# Patient Record
Sex: FEMALE | Race: WHITE
[De-identification: names, ages, dates, MRNs, and addresses within clinical notes are randomized per-mention and may not be internally consistent; named-entity substitution may affect disease eponyms.]

---

## 2019-02-23 ENCOUNTER — HOSPITAL ENCOUNTER (EMERGENCY)
Dept: HOSPITAL 26 - MED | Age: 54
LOS: 1 days | Discharge: HOME | End: 2019-02-24
Payer: MEDICAID

## 2019-02-23 VITALS — SYSTOLIC BLOOD PRESSURE: 132 MMHG | DIASTOLIC BLOOD PRESSURE: 94 MMHG

## 2019-02-23 VITALS — WEIGHT: 145 LBS | BODY MASS INDEX: 27.38 KG/M2 | HEIGHT: 61 IN

## 2019-02-23 DIAGNOSIS — I50.9: ICD-10-CM

## 2019-02-23 DIAGNOSIS — Z98.890: ICD-10-CM

## 2019-02-23 DIAGNOSIS — Z79.899: ICD-10-CM

## 2019-02-23 DIAGNOSIS — F15.10: ICD-10-CM

## 2019-02-23 DIAGNOSIS — E11.9: ICD-10-CM

## 2019-02-23 DIAGNOSIS — I11.0: ICD-10-CM

## 2019-02-23 DIAGNOSIS — Z79.2: ICD-10-CM

## 2019-02-23 DIAGNOSIS — Z79.82: ICD-10-CM

## 2019-02-23 DIAGNOSIS — R10.10: ICD-10-CM

## 2019-02-23 DIAGNOSIS — Z79.84: ICD-10-CM

## 2019-02-23 DIAGNOSIS — J45.909: Primary | ICD-10-CM

## 2019-02-23 LAB
ALBUMIN FLD-MCNC: 4.1 G/DL (ref 3.4–5)
ANION GAP SERPL CALCULATED.3IONS-SCNC: 10.2 MMOL/L (ref 8–16)
AST SERPL-CCNC: 23 U/L (ref 15–37)
BASOPHILS # BLD AUTO: 0.1 K/UL (ref 0–0.22)
BASOPHILS NFR BLD AUTO: 0.7 % (ref 0–2)
BILIRUB SERPL-MCNC: 1.6 MG/DL (ref 0–1)
BUN SERPL-MCNC: 22 MG/DL (ref 7–18)
CHLORIDE SERPL-SCNC: 99 MMOL/L (ref 98–107)
CO2 SERPL-SCNC: 32.4 MMOL/L (ref 21–32)
CREAT SERPL-MCNC: 1.1 MG/DL (ref 0.6–1.3)
EOSINOPHIL # BLD AUTO: 0.3 K/UL (ref 0–0.4)
EOSINOPHIL NFR BLD AUTO: 3.4 % (ref 0–4)
ERYTHROCYTE [DISTWIDTH] IN BLOOD BY AUTOMATED COUNT: 14.5 % (ref 11.6–13.7)
GFR SERPL CREATININE-BSD FRML MDRD: 67 ML/MIN (ref 90–?)
GLUCOSE SERPL-MCNC: 109 MG/DL (ref 74–106)
HCT VFR BLD AUTO: 37.8 % (ref 36–48)
HGB BLD-MCNC: 12.1 G/DL (ref 12–16)
LYMPHOCYTES # BLD AUTO: 0.6 K/UL (ref 2.5–16.5)
LYMPHOCYTES NFR BLD AUTO: 8.1 % (ref 20.5–51.1)
MCH RBC QN AUTO: 28 PG (ref 27–31)
MCHC RBC AUTO-ENTMCNC: 32 G/DL (ref 33–37)
MCV RBC AUTO: 87.8 FL (ref 80–94)
MONOCYTES # BLD AUTO: 0.5 K/UL (ref 0.8–1)
MONOCYTES NFR BLD AUTO: 6.7 % (ref 1.7–9.3)
NEUTROPHILS # BLD AUTO: 6.4 K/UL (ref 1.8–7.7)
NEUTROPHILS NFR BLD AUTO: 81.1 % (ref 42.2–75.2)
PLATELET # BLD AUTO: 307 K/UL (ref 140–450)
POTASSIUM SERPL-SCNC: 3.6 MMOL/L (ref 3.5–5.1)
RBC # BLD AUTO: 4.3 MIL/UL (ref 4.2–5.4)
SODIUM SERPL-SCNC: 138 MMOL/L (ref 136–145)
WBC # BLD AUTO: 7.9 K/UL (ref 4.8–10.8)

## 2019-02-23 PROCEDURE — 96374 THER/PROPH/DIAG INJ IV PUSH: CPT

## 2019-02-23 PROCEDURE — 87804 INFLUENZA ASSAY W/OPTIC: CPT

## 2019-02-23 PROCEDURE — 83880 ASSAY OF NATRIURETIC PEPTIDE: CPT

## 2019-02-23 PROCEDURE — 85025 COMPLETE CBC W/AUTO DIFF WBC: CPT

## 2019-02-23 PROCEDURE — 93005 ELECTROCARDIOGRAM TRACING: CPT

## 2019-02-23 PROCEDURE — 84484 ASSAY OF TROPONIN QUANT: CPT

## 2019-02-23 PROCEDURE — 80053 COMPREHEN METABOLIC PANEL: CPT

## 2019-02-23 PROCEDURE — 83605 ASSAY OF LACTIC ACID: CPT

## 2019-02-23 PROCEDURE — 71045 X-RAY EXAM CHEST 1 VIEW: CPT

## 2019-02-23 PROCEDURE — 94640 AIRWAY INHALATION TREATMENT: CPT

## 2019-02-23 PROCEDURE — 36415 COLL VENOUS BLD VENIPUNCTURE: CPT

## 2019-02-23 PROCEDURE — 99284 EMERGENCY DEPT VISIT MOD MDM: CPT

## 2019-02-23 NOTE — NUR
PATIENT PRESENTS ER WITH C/O SOB DUE TO ASTHMA ATTACK. PT HAS HX OF ASTHMA AND 
CHF. PT STATED THAT SHE TRIED TAKING HER ASTHMA MEDICATION AT HOME,BUT HAD NO 
RELIEF.PT HAS SOME WHEEZES BILATERAL AND A DRY COUGH. PATIENT STATES PAIN OF 
7/10 AT THIS TIME; VSS; PATIENT POSITIONED FOR COMFORT; HOB ELEVATED; BEDRAILS 
UP X2; BED DOWN. ER MD MADE AWARE OF PT STATUS.

## 2019-02-23 NOTE — NUR
PT IS SITTING UP IN BED, PT STATES SHE IS ABLE IN LESS PAIN AND IS ABLE TO 
BREATH WITHOUT DIFFICULTY. ER MD MADE AWARE.

## 2019-02-24 VITALS — DIASTOLIC BLOOD PRESSURE: 115 MMHG | SYSTOLIC BLOOD PRESSURE: 176 MMHG

## 2019-02-24 NOTE — NUR
Patient discharged with v/s stable. Written and verbal after care instructions 
given and explained. 

Patient alert, oriented and verbalized understanding of instructions. 
Ambulatory with steady gait. All questions addressed prior to discharge. ID 
band removed. Patient advised to follow up with PMD. Rx of PREDNISONE AND 
ALBUTEROL WERE given. Patient educated on indication of medication including 
possible reaction and side effects. Opportunity to ask questions provided and 
answered.

## 2019-02-25 ENCOUNTER — HOSPITAL ENCOUNTER (INPATIENT)
Dept: HOSPITAL 26 - MED | Age: 54
LOS: 7 days | Discharge: LEFT BEFORE DISCHARGE | DRG: 190 | End: 2019-03-04
Attending: GENERAL PRACTICE | Admitting: GENERAL PRACTICE
Payer: MEDICAID

## 2019-02-25 VITALS — SYSTOLIC BLOOD PRESSURE: 121 MMHG | DIASTOLIC BLOOD PRESSURE: 81 MMHG

## 2019-02-25 VITALS — DIASTOLIC BLOOD PRESSURE: 92 MMHG | SYSTOLIC BLOOD PRESSURE: 140 MMHG

## 2019-02-25 VITALS — SYSTOLIC BLOOD PRESSURE: 139 MMHG | DIASTOLIC BLOOD PRESSURE: 98 MMHG

## 2019-02-25 VITALS — SYSTOLIC BLOOD PRESSURE: 129 MMHG | DIASTOLIC BLOOD PRESSURE: 92 MMHG

## 2019-02-25 VITALS — DIASTOLIC BLOOD PRESSURE: 84 MMHG | SYSTOLIC BLOOD PRESSURE: 148 MMHG

## 2019-02-25 VITALS — SYSTOLIC BLOOD PRESSURE: 120 MMHG | DIASTOLIC BLOOD PRESSURE: 71 MMHG

## 2019-02-25 VITALS — SYSTOLIC BLOOD PRESSURE: 132 MMHG | DIASTOLIC BLOOD PRESSURE: 94 MMHG

## 2019-02-25 VITALS — SYSTOLIC BLOOD PRESSURE: 127 MMHG | DIASTOLIC BLOOD PRESSURE: 86 MMHG

## 2019-02-25 VITALS — SYSTOLIC BLOOD PRESSURE: 148 MMHG | DIASTOLIC BLOOD PRESSURE: 101 MMHG

## 2019-02-25 VITALS — SYSTOLIC BLOOD PRESSURE: 98 MMHG | DIASTOLIC BLOOD PRESSURE: 65 MMHG

## 2019-02-25 VITALS — DIASTOLIC BLOOD PRESSURE: 92 MMHG | SYSTOLIC BLOOD PRESSURE: 134 MMHG

## 2019-02-25 VITALS — SYSTOLIC BLOOD PRESSURE: 119 MMHG | DIASTOLIC BLOOD PRESSURE: 79 MMHG

## 2019-02-25 VITALS — SYSTOLIC BLOOD PRESSURE: 122 MMHG | DIASTOLIC BLOOD PRESSURE: 81 MMHG

## 2019-02-25 VITALS — SYSTOLIC BLOOD PRESSURE: 134 MMHG | DIASTOLIC BLOOD PRESSURE: 88 MMHG

## 2019-02-25 VITALS — DIASTOLIC BLOOD PRESSURE: 76 MMHG | SYSTOLIC BLOOD PRESSURE: 114 MMHG

## 2019-02-25 VITALS — DIASTOLIC BLOOD PRESSURE: 121 MMHG | SYSTOLIC BLOOD PRESSURE: 166 MMHG

## 2019-02-25 VITALS — DIASTOLIC BLOOD PRESSURE: 101 MMHG | SYSTOLIC BLOOD PRESSURE: 129 MMHG

## 2019-02-25 VITALS — DIASTOLIC BLOOD PRESSURE: 103 MMHG | SYSTOLIC BLOOD PRESSURE: 150 MMHG

## 2019-02-25 VITALS — DIASTOLIC BLOOD PRESSURE: 82 MMHG | SYSTOLIC BLOOD PRESSURE: 129 MMHG

## 2019-02-25 VITALS — SYSTOLIC BLOOD PRESSURE: 105 MMHG | DIASTOLIC BLOOD PRESSURE: 68 MMHG

## 2019-02-25 VITALS — SYSTOLIC BLOOD PRESSURE: 136 MMHG | DIASTOLIC BLOOD PRESSURE: 78 MMHG

## 2019-02-25 VITALS — SYSTOLIC BLOOD PRESSURE: 103 MMHG | DIASTOLIC BLOOD PRESSURE: 78 MMHG

## 2019-02-25 VITALS — SYSTOLIC BLOOD PRESSURE: 140 MMHG | DIASTOLIC BLOOD PRESSURE: 92 MMHG

## 2019-02-25 VITALS — DIASTOLIC BLOOD PRESSURE: 77 MMHG | SYSTOLIC BLOOD PRESSURE: 112 MMHG

## 2019-02-25 VITALS — DIASTOLIC BLOOD PRESSURE: 106 MMHG | SYSTOLIC BLOOD PRESSURE: 151 MMHG

## 2019-02-25 VITALS — SYSTOLIC BLOOD PRESSURE: 138 MMHG | DIASTOLIC BLOOD PRESSURE: 76 MMHG

## 2019-02-25 VITALS — DIASTOLIC BLOOD PRESSURE: 98 MMHG | SYSTOLIC BLOOD PRESSURE: 124 MMHG

## 2019-02-25 VITALS — WEIGHT: 123 LBS | HEIGHT: 65 IN | BODY MASS INDEX: 20.49 KG/M2

## 2019-02-25 VITALS — DIASTOLIC BLOOD PRESSURE: 92 MMHG | SYSTOLIC BLOOD PRESSURE: 129 MMHG

## 2019-02-25 VITALS — SYSTOLIC BLOOD PRESSURE: 109 MMHG | DIASTOLIC BLOOD PRESSURE: 72 MMHG

## 2019-02-25 VITALS — SYSTOLIC BLOOD PRESSURE: 135 MMHG | DIASTOLIC BLOOD PRESSURE: 86 MMHG

## 2019-02-25 VITALS — SYSTOLIC BLOOD PRESSURE: 120 MMHG | DIASTOLIC BLOOD PRESSURE: 83 MMHG

## 2019-02-25 VITALS — SYSTOLIC BLOOD PRESSURE: 142 MMHG | DIASTOLIC BLOOD PRESSURE: 85 MMHG

## 2019-02-25 VITALS — DIASTOLIC BLOOD PRESSURE: 64 MMHG | SYSTOLIC BLOOD PRESSURE: 94 MMHG

## 2019-02-25 VITALS — SYSTOLIC BLOOD PRESSURE: 118 MMHG | DIASTOLIC BLOOD PRESSURE: 83 MMHG

## 2019-02-25 VITALS — DIASTOLIC BLOOD PRESSURE: 94 MMHG | SYSTOLIC BLOOD PRESSURE: 138 MMHG

## 2019-02-25 VITALS — DIASTOLIC BLOOD PRESSURE: 67 MMHG | SYSTOLIC BLOOD PRESSURE: 100 MMHG

## 2019-02-25 DIAGNOSIS — J18.9: ICD-10-CM

## 2019-02-25 DIAGNOSIS — I16.0: ICD-10-CM

## 2019-02-25 DIAGNOSIS — G92: ICD-10-CM

## 2019-02-25 DIAGNOSIS — J45.901: ICD-10-CM

## 2019-02-25 DIAGNOSIS — F15.10: ICD-10-CM

## 2019-02-25 DIAGNOSIS — E83.39: ICD-10-CM

## 2019-02-25 DIAGNOSIS — F19.90: ICD-10-CM

## 2019-02-25 DIAGNOSIS — Z90.721: ICD-10-CM

## 2019-02-25 DIAGNOSIS — I50.43: ICD-10-CM

## 2019-02-25 DIAGNOSIS — E66.9: ICD-10-CM

## 2019-02-25 DIAGNOSIS — I11.0: ICD-10-CM

## 2019-02-25 DIAGNOSIS — J11.1: ICD-10-CM

## 2019-02-25 DIAGNOSIS — J96.01: ICD-10-CM

## 2019-02-25 DIAGNOSIS — E78.5: ICD-10-CM

## 2019-02-25 DIAGNOSIS — I21.A1: Primary | ICD-10-CM

## 2019-02-25 DIAGNOSIS — Z91.19: ICD-10-CM

## 2019-02-25 DIAGNOSIS — E44.0: ICD-10-CM

## 2019-02-25 DIAGNOSIS — E11.65: ICD-10-CM

## 2019-02-25 DIAGNOSIS — I42.7: ICD-10-CM

## 2019-02-25 LAB
ALBUMIN FLD-MCNC: 3.7 G/DL (ref 3.4–5)
AMYLASE SERPL-CCNC: 29 U/L (ref 25–115)
ANION GAP SERPL CALCULATED.3IONS-SCNC: 7 MMOL/L (ref 8–16)
APPEARANCE UR: CLEAR
AST SERPL-CCNC: 29 U/L (ref 15–37)
BARBITURATES UR QL SCN: (no result) NG/ML
BASOPHILS # BLD AUTO: 0 K/UL (ref 0–0.22)
BASOPHILS NFR BLD AUTO: 0.1 % (ref 0–2)
BENZODIAZ UR QL SCN: (no result) NG/ML
BILIRUB SERPL-MCNC: 0.4 MG/DL (ref 0–1)
BILIRUB UR QL STRIP: NEGATIVE
BUN SERPL-MCNC: 22 MG/DL (ref 7–18)
BZE UR QL SCN: (no result) NG/ML
CANNABINOIDS UR QL SCN: (no result) NG/ML
CHLORIDE SERPL-SCNC: 102 MMOL/L (ref 98–107)
CO2 SERPL-SCNC: 32 MMOL/L (ref 21–32)
COLOR UR: YELLOW
CREAT SERPL-MCNC: 1 MG/DL (ref 0.6–1.3)
EOSINOPHIL # BLD AUTO: 0 K/UL (ref 0–0.4)
EOSINOPHIL NFR BLD AUTO: 0 % (ref 0–4)
ERYTHROCYTE [DISTWIDTH] IN BLOOD BY AUTOMATED COUNT: 14.6 % (ref 11.6–13.7)
GFR SERPL CREATININE-BSD FRML MDRD: 75 ML/MIN (ref 90–?)
GLUCOSE SERPL-MCNC: 154 MG/DL (ref 74–106)
GLUCOSE UR STRIP-MCNC: NEGATIVE MG/DL
HCT VFR BLD AUTO: 38.6 % (ref 36–48)
HGB BLD-MCNC: 12.3 G/DL (ref 12–16)
HGB UR QL STRIP: (no result)
LEUKOCYTE ESTERASE UR QL STRIP: NEGATIVE
LIPASE SERPL-CCNC: 109 U/L (ref 73–393)
LYMPHOCYTES # BLD AUTO: 0.6 K/UL (ref 2.5–16.5)
LYMPHOCYTES NFR BLD AUTO: 5.3 % (ref 20.5–51.1)
MAGNESIUM SERPL-MCNC: 2 MG/DL (ref 1.8–2.4)
MCH RBC QN AUTO: 29 PG (ref 27–31)
MCHC RBC AUTO-ENTMCNC: 32 G/DL (ref 33–37)
MCV RBC AUTO: 89.8 FL (ref 80–94)
MONOCYTES # BLD AUTO: 0.3 K/UL (ref 0.8–1)
MONOCYTES NFR BLD AUTO: 2.4 % (ref 1.7–9.3)
NEUTROPHILS # BLD AUTO: 9.7 K/UL (ref 1.8–7.7)
NEUTROPHILS NFR BLD AUTO: 92.2 % (ref 42.2–75.2)
NITRITE UR QL STRIP: NEGATIVE
OPIATES UR QL SCN: (no result) NG/ML
PCP UR QL SCN: (no result) NG/ML
PH UR STRIP: 6 [PH] (ref 5–9)
PHOSPHATE SERPL-MCNC: 3.9 MG/DL (ref 2.5–4.9)
PLATELET # BLD AUTO: 297 K/UL (ref 140–450)
POTASSIUM SERPL-SCNC: 4 MMOL/L (ref 3.5–5.1)
PROTHROMBIN TIME: 10.5 SECS (ref 10.8–13.4)
RBC # BLD AUTO: 4.3 MIL/UL (ref 4.2–5.4)
RBC #/AREA URNS HPF: (no result) /HPF (ref 0–5)
SODIUM SERPL-SCNC: 137 MMOL/L (ref 136–145)
T4 FREE SERPL-MCNC: 0.9 NG/DL (ref 0.76–1.46)
TSH SERPL DL<=0.05 MIU/L-ACNC: 0.66 UIU/ML (ref 0.34–3.74)
WBC # BLD AUTO: 10.5 K/UL (ref 4.8–10.8)
WBC,URINE: (no result) /HPF (ref 0–5)

## 2019-02-25 PROCEDURE — C9113 INJ PANTOPRAZOLE SODIUM, VIA: HCPCS

## 2019-02-25 PROCEDURE — 5A09357 ASSISTANCE WITH RESPIRATORY VENTILATION, LESS THAN 24 CONSECUTIVE HOURS, CONTINUOUS POSITIVE AIRWAY PRESSURE: ICD-10-PCS

## 2019-02-25 PROCEDURE — C1758 CATHETER, URETERAL: HCPCS

## 2019-02-25 RX ADMIN — OSELTAMIVIR PHOSPHATE SCH MG: 75 CAPSULE ORAL at 20:53

## 2019-02-25 RX ADMIN — DOCUSATE SODIUM SCH MG: 100 CAPSULE, LIQUID FILLED ORAL at 09:14

## 2019-02-25 RX ADMIN — DEXTROSE AND SODIUM CHLORIDE SCH MLS/HR: 5; .9 INJECTION, SOLUTION INTRAVENOUS at 09:27

## 2019-02-25 RX ADMIN — FUROSEMIDE SCH MG: 10 INJECTION, SOLUTION INTRAMUSCULAR; INTRAVENOUS at 17:13

## 2019-02-25 RX ADMIN — IPRATROPIUM BROMIDE AND ALBUTEROL SULFATE SCH ML: .5; 3 SOLUTION RESPIRATORY (INHALATION) at 16:44

## 2019-02-25 RX ADMIN — OSELTAMIVIR PHOSPHATE SCH MG: 75 CAPSULE ORAL at 09:14

## 2019-02-25 RX ADMIN — Medication SCH DEV: at 17:11

## 2019-02-25 RX ADMIN — Medication SCH DEV: at 20:53

## 2019-02-25 RX ADMIN — IPRATROPIUM BROMIDE AND ALBUTEROL SULFATE SCH ML: .5; 3 SOLUTION RESPIRATORY (INHALATION) at 19:30

## 2019-02-25 RX ADMIN — PANTOPRAZOLE SODIUM SCH MG: 40 INJECTION, POWDER, FOR SOLUTION INTRAVENOUS at 09:14

## 2019-02-25 RX ADMIN — DOCUSATE SODIUM SCH MG: 100 CAPSULE, LIQUID FILLED ORAL at 20:53

## 2019-02-25 RX ADMIN — CARVEDILOL SCH MG: 12.5 TABLET, FILM COATED ORAL at 20:54

## 2019-02-25 NOTE — NUR
RECEIVED BEDSIDE REPORT FROM MORNING SHIFT RNAYLA, FOR CONTINUITY OF CARE. PT AWAKE, 
ALERT, ABLE TO MAKE NEEDS KNOWN. AFEBRILE TEMP 97.5. Rwandan SPEAKING. ON DROPLET 
PRECAUTIONS. FAIR CATH IN PLACE. NO BM AT THIS TIME. BOWEL SOUND ACTIVE X4, LUNG SOUND 
CLEAR/DIMINISHED ON UPPER/LOWER BASES. D5 NS INFSING TO RIGHT HAND PIV 20 GAUGE AT 10CC/HR. 
LEFT A/C 20 GAUGE PIV NOTED. PT DENIES PAIN/NAUSEA AT THIS TIME. ABLE TO REPOSITION SELF IN 
BED. 

-------------------------------------------------------------------------------

Addendum: 02/26/19 at 2205 by Mercy Walker RN

-------------------------------------------------------------------------------

RECEIVED PT ON 4 LPM NASAL CANNULA. SKIN INTACT, ABLE TO REPOSITION SELF IN BED.

## 2019-02-25 NOTE — NUR
RECEIVED PT FROM ER VIA [facility]. NOTED ON BIPAP. PT A/O X3. ABLE TO MAKE NEEDS KNOWN. 
TACHYCARDIC. SKIN DRY AND WARM TO TOUCH. LUNGS WHEEZES. PERIPHERAL LINE NOTED ON RIGHT HAND 
20G. INTACT LINE. NITROGLYCERINE RUNNING AT 20 MCG/MIN. LEFT AC 20G. NS RUNNING AT 10 ML/HR. 
ABDOMEN SOFT, ROUND AND NON-TENDER. ACTIVE BOWEL SOUND. SKIN INTACT. KEPT HOB ELEVATED. BED 
IN LOW POSITION LOCKED. WILL CONTINUE TO MONITOR.

## 2019-02-25 NOTE — NUR
Patient will be admitted to care of Dr. Casillas. Admited to ICU.  Will go to room 
8. Belongings list completed.  Report to Aruna LORENZO.

## 2019-02-25 NOTE — NUR
PT BIB SELF C/O SOB. PT STATED SUDDEN ON SET OF SOB, PT PRESENTED TO THE ED W/ 
STEADY GATE, LABORED BREATHING, WHEEZING BL, AAOX3. BL LOWER EXTREMITY EDEMA. 
DENIES N/V/D. ER MD AT BEDSIDE. RT CALLED. PT PLACED ON FACE MASK AT 10L. WILL 
CONTINUE TO MONITOR. 



PMH: ASTHMA, CHF, COPD, HTN

RX: ALBUTEROL

## 2019-02-25 NOTE — NUR
NO SOB OR ACUTE RESPIRATORY DISTRESS NOTED. DENIES PAIN. CONTINUE ON BIPAP. WILL CONTINUE TO 
MONITOR.

## 2019-02-25 NOTE — NUR
ON O2 VIA NC AT 3 LTR/MIN. TOLERATING WELL. NO SOB OR ACUTE DISTRESS NOTED. HOB ELEVATED. 
BED IN LOW POSITION LOCKED. DENIES ANY PAIN.

## 2019-02-25 NOTE — NUR
PT ABLE TO ASSIST IN REPOSITIONING PATIENT IN BED, DARWIN PAIN/NAUSEA AT THIS TIME. HOB 
ELEVATED ABOVE 30 DEG.

## 2019-02-25 NOTE — NUR
PT ON BIPAP 14/6, R 14 FIO2 30%. PT IS TACHYPNEIC AND TACHYCARDIC AT THIS TIME. PT 
TOLERATING BIPAP SETTINGS WELL. BIPAP IS PLUGGED INTO A RED OUTLET WITH ALARMS ON AND 
FUNCTIONING.  PROTECTA GEL PLACED UNDER MASK FOR SKIN PROTECTION. WILL CONTINUE TO MONITOR.

## 2019-02-25 NOTE — NUR
# 14 FR Urinary catheter inserted utilizing sterile technique. Immediate return 
of 100 ml of clear, yellow urine noted. Urine sample collected and sent to lab. 
Pt tolerated procedure well.

## 2019-02-26 VITALS — SYSTOLIC BLOOD PRESSURE: 97 MMHG | DIASTOLIC BLOOD PRESSURE: 60 MMHG

## 2019-02-26 VITALS — SYSTOLIC BLOOD PRESSURE: 107 MMHG | DIASTOLIC BLOOD PRESSURE: 66 MMHG

## 2019-02-26 VITALS — SYSTOLIC BLOOD PRESSURE: 108 MMHG | DIASTOLIC BLOOD PRESSURE: 58 MMHG

## 2019-02-26 VITALS — DIASTOLIC BLOOD PRESSURE: 90 MMHG | SYSTOLIC BLOOD PRESSURE: 127 MMHG

## 2019-02-26 VITALS — DIASTOLIC BLOOD PRESSURE: 75 MMHG | SYSTOLIC BLOOD PRESSURE: 141 MMHG

## 2019-02-26 VITALS — DIASTOLIC BLOOD PRESSURE: 58 MMHG | SYSTOLIC BLOOD PRESSURE: 97 MMHG

## 2019-02-26 VITALS — DIASTOLIC BLOOD PRESSURE: 83 MMHG | SYSTOLIC BLOOD PRESSURE: 126 MMHG

## 2019-02-26 VITALS — SYSTOLIC BLOOD PRESSURE: 99 MMHG | DIASTOLIC BLOOD PRESSURE: 54 MMHG

## 2019-02-26 VITALS — DIASTOLIC BLOOD PRESSURE: 61 MMHG | SYSTOLIC BLOOD PRESSURE: 105 MMHG

## 2019-02-26 VITALS — SYSTOLIC BLOOD PRESSURE: 127 MMHG | DIASTOLIC BLOOD PRESSURE: 83 MMHG

## 2019-02-26 VITALS — SYSTOLIC BLOOD PRESSURE: 128 MMHG | DIASTOLIC BLOOD PRESSURE: 79 MMHG

## 2019-02-26 LAB
ANION GAP SERPL CALCULATED.3IONS-SCNC: 10.7 MMOL/L (ref 8–16)
BASOPHILS # BLD AUTO: 0 K/UL (ref 0–0.22)
BASOPHILS NFR BLD AUTO: 0.2 % (ref 0–2)
BUN SERPL-MCNC: 35 MG/DL (ref 7–18)
CHLORIDE SERPL-SCNC: 98 MMOL/L (ref 98–107)
CHOLEST/HDLC SERPL: 2.2 {RATIO} (ref 1–4.5)
CO2 SERPL-SCNC: 34.1 MMOL/L (ref 21–32)
CREAT SERPL-MCNC: 1.1 MG/DL (ref 0.6–1.3)
EOSINOPHIL # BLD AUTO: 0 K/UL (ref 0–0.4)
EOSINOPHIL NFR BLD AUTO: 0.1 % (ref 0–4)
ERYTHROCYTE [DISTWIDTH] IN BLOOD BY AUTOMATED COUNT: 14.7 % (ref 11.6–13.7)
GFR SERPL CREATININE-BSD FRML MDRD: 67 ML/MIN (ref 90–?)
GLUCOSE SERPL-MCNC: 112 MG/DL (ref 74–106)
HCT VFR BLD AUTO: 36.3 % (ref 36–48)
HDLC SERPL-MCNC: 71 MG/DL (ref 40–60)
HGB BLD-MCNC: 11.5 G/DL (ref 12–16)
LDLC SERPL CALC-MCNC: 65 MG/DL (ref 60–100)
LYMPHOCYTES # BLD AUTO: 0.5 K/UL (ref 2.5–16.5)
LYMPHOCYTES NFR BLD AUTO: 6.2 % (ref 20.5–51.1)
MAGNESIUM SERPL-MCNC: 1.8 MG/DL (ref 1.8–2.4)
MCH RBC QN AUTO: 28 PG (ref 27–31)
MCHC RBC AUTO-ENTMCNC: 32 G/DL (ref 33–37)
MCV RBC AUTO: 89.6 FL (ref 80–94)
MONOCYTES # BLD AUTO: 0.5 K/UL (ref 0.8–1)
MONOCYTES NFR BLD AUTO: 6.5 % (ref 1.7–9.3)
NEUTROPHILS # BLD AUTO: 6.8 K/UL (ref 1.8–7.7)
NEUTROPHILS NFR BLD AUTO: 87 % (ref 42.2–75.2)
PHOSPHATE SERPL-MCNC: 4 MG/DL (ref 2.5–4.9)
PLATELET # BLD AUTO: 257 K/UL (ref 140–450)
POTASSIUM SERPL-SCNC: 3.8 MMOL/L (ref 3.5–5.1)
PROTHROMBIN TIME: 10 SECS (ref 10.8–13.4)
PROTHROMBIN TIME: 10.4 SECS (ref 10.8–13.4)
RBC # BLD AUTO: 4.05 MIL/UL (ref 4.2–5.4)
SODIUM SERPL-SCNC: 139 MMOL/L (ref 136–145)
TRIGL SERPL-MCNC: 95 MG/DL (ref 30–150)
WBC # BLD AUTO: 7.8 K/UL (ref 4.8–10.8)

## 2019-02-26 RX ADMIN — WATER SCH MG: 1 INJECTION INTRAMUSCULAR; INTRAVENOUS; SUBCUTANEOUS at 07:45

## 2019-02-26 RX ADMIN — CARVEDILOL SCH MG: 12.5 TABLET, FILM COATED ORAL at 20:12

## 2019-02-26 RX ADMIN — IPRATROPIUM BROMIDE AND ALBUTEROL SULFATE SCH ML: .5; 3 SOLUTION RESPIRATORY (INHALATION) at 06:48

## 2019-02-26 RX ADMIN — INSULIN LISPRO PRN UNITS: 100 INJECTION, SOLUTION INTRAVENOUS; SUBCUTANEOUS at 12:01

## 2019-02-26 RX ADMIN — ATORVASTATIN CALCIUM SCH MG: 20 TABLET, FILM COATED ORAL at 08:33

## 2019-02-26 RX ADMIN — PANTOPRAZOLE SODIUM SCH MG: 40 INJECTION, POWDER, FOR SOLUTION INTRAVENOUS at 08:33

## 2019-02-26 RX ADMIN — CARVEDILOL SCH MG: 12.5 TABLET, FILM COATED ORAL at 08:33

## 2019-02-26 RX ADMIN — DOCUSATE SODIUM SCH MG: 100 CAPSULE, LIQUID FILLED ORAL at 20:11

## 2019-02-26 RX ADMIN — Medication SCH DEV: at 12:01

## 2019-02-26 RX ADMIN — INSULIN LISPRO PRN UNITS: 100 INJECTION, SOLUTION INTRAVENOUS; SUBCUTANEOUS at 21:22

## 2019-02-26 RX ADMIN — IPRATROPIUM BROMIDE AND ALBUTEROL SULFATE PRN ML: .5; 3 SOLUTION RESPIRATORY (INHALATION) at 08:57

## 2019-02-26 RX ADMIN — DOCUSATE SODIUM SCH MG: 100 CAPSULE, LIQUID FILLED ORAL at 08:35

## 2019-02-26 RX ADMIN — INSULIN LISPRO PRN UNITS: 100 INJECTION, SOLUTION INTRAVENOUS; SUBCUTANEOUS at 16:44

## 2019-02-26 RX ADMIN — Medication SCH DEV: at 07:42

## 2019-02-26 RX ADMIN — DEXTROSE AND SODIUM CHLORIDE SCH MLS/HR: 5; .9 INJECTION, SOLUTION INTRAVENOUS at 08:36

## 2019-02-26 RX ADMIN — IPRATROPIUM BROMIDE AND ALBUTEROL SULFATE SCH ML: .5; 3 SOLUTION RESPIRATORY (INHALATION) at 19:00

## 2019-02-26 RX ADMIN — IPRATROPIUM BROMIDE AND ALBUTEROL SULFATE SCH ML: .5; 3 SOLUTION RESPIRATORY (INHALATION) at 13:00

## 2019-02-26 RX ADMIN — WATER SCH MG: 1 INJECTION INTRAMUSCULAR; INTRAVENOUS; SUBCUTANEOUS at 20:11

## 2019-02-26 RX ADMIN — Medication SCH MG: at 08:34

## 2019-02-26 RX ADMIN — LISINOPRIL SCH MG: 10 TABLET ORAL at 09:00

## 2019-02-26 RX ADMIN — OSELTAMIVIR PHOSPHATE SCH MG: 75 CAPSULE ORAL at 08:35

## 2019-02-26 RX ADMIN — Medication SCH DEV: at 20:28

## 2019-02-26 RX ADMIN — OSELTAMIVIR PHOSPHATE SCH MG: 75 CAPSULE ORAL at 20:30

## 2019-02-26 RX ADMIN — WATER SCH MG: 1 INJECTION INTRAMUSCULAR; INTRAVENOUS; SUBCUTANEOUS at 12:37

## 2019-02-26 RX ADMIN — FUROSEMIDE SCH MG: 10 INJECTION, SOLUTION INTRAMUSCULAR; INTRAVENOUS at 08:33

## 2019-02-26 RX ADMIN — FUROSEMIDE SCH MG: 10 INJECTION, SOLUTION INTRAMUSCULAR; INTRAVENOUS at 16:45

## 2019-02-26 RX ADMIN — Medication SCH DEV: at 16:43

## 2019-02-26 NOTE — NUR
RECEIVED REPORT AT BEDSIDE FROM NIGHT SHIFT RN, PT IS AAOX4, Japanese SPEAKING ONLY, ABLE TO 
FOLLOW COMMANDS AND MAKE NEEDS KNOWN, VSS, DENIES PAIN, CONGESTED BREATHING WITH EXCESSIVE 
MUSCLE USE NOTED, WHEEZES LUNG SOUNDS LETICIA, RT NOTIFIED, ST ON CARDIAC MONITOR, SOFT ABDOMEN 
WITH ACTIVE BOWEL SOUNDS, ON CCHO DIET,  F/C IN PLACE WITH CLEAR YELLOW URINE VIA GRAVITY, 
SKIN IS INTACT, WARM AND DRY TO TOUCH, SLIGHT WEAKNESS TO EXTREMITIES, PERIPHERAL LINE TO 
LEFT AC, 20GA, PATENT AND SL, IV TO RIGHT HAND, 20GA, RUNNING HEPARIN AT 1400 UNITS/HR. HOB 
ELEVATED 30 DEGREES, SAFETY MEASURES IN PLACE, CALL LIGHT WITHIN REACH, WILL CONTINUE TO 
MONITOR.

## 2019-02-26 NOTE — NUR
CALLED PHARMACY UPDATED THAT 6AM SOLU-MEDROL WAS NOT AVAILABLE IN THE [facility] AND THAT IT 
WOULD BE GIVEN LATE, PHARMACY STATED OKAY TO OVERRIDE SOLU MEDROL IN [facility] AND TO GIVE. WILL 
CARRY OUT.

## 2019-02-26 NOTE — NUR
SCHEDULED MEDICATION GIVEN, PT % OF BREAKFAST. MEDICATION INDICATION AND SIDE EFFECTS 
EXPLAINED TO PT, PT VERBALIZED UNDERSTANDING.

## 2019-02-26 NOTE — NUR
PT IS ASLEEP IN BED, REMOVED NC BY HERSELF, O2 SAT 89%, REEDUCATED PT ON O2 USE, PT 
VERBALIZED UNDERSTANDING.

## 2019-02-26 NOTE — NUR
BRITTANY FROM, LAB WAS IN TO SEE PATIENT. BED BATH GIVEN TO PATIENT, FRESH LINENS PROVIDED. PT 
GIVEN WITH HOT TEA TO DRINK, NO SUGAR ADDED. 

-------------------------------------------------------------------------------

Addendum: 02/27/19 at 0439 by Mercy Walker RN

-------------------------------------------------------------------------------

PT HAD SMALL BM (SMEAR).

## 2019-02-26 NOTE — NUR
PT SLEEPING QUIETLY AT BEDSIDE, EASY TO AROUSE. HOB ELEVATED ABOVE 30 DEG, LEG ELEVATED WITH 
PILLOW. ABLE TO REPOSITION SELF IN BED.

## 2019-02-26 NOTE — NUR
NO S/S OF DISTRESS, PT IS MORE CLAM, RESTING IN BED, VSS, DENIES PAIN, ABLE TO REPOSITION 
SELF AT THIS TIME.

## 2019-02-26 NOTE — NUR
CALLED DR. BRIDGES, UPDATED PT HAD COMPLAINTS OF PAIN IN CHEST AND ABDOMEN, RATED 8-9/10. 
WILL UPDATE ORDERS.

## 2019-02-26 NOTE — NUR
RECEIVED BEDSIDE REPORT FROM MORNING SHIFT RNGIOVANI, FOR CONTINUITY OF CARE. PT IS AWAKE, 
LETHARGIC. HR 94, /63, SATING 96-97%, RR 18-20. AFEBRILE, TEMP 96.9, TEMPORAL SCAN. ON 
6LPM NASAL CANNULA. LUNG SOUNDS CLEAR/DIMINISHED ON UPPER/LOWER BILATERAL LOBES.  SR ON 
CARDIAC MONITOR.HEPARIN INFUSING TO RIGHT FA PIV  UNITS/HR. FAIR CATHETER IN PLACE. 
SKIN IS NORMAL IN COLOR, COOL TO TOUCH, PT STATED SHE IS A BIT COLD "HACE FRIO". ADDITIONAL 
BLANKET WILL BE PROVIDED TO PATIENT. BOWEL SOUNDS ACTIVE X4. FAIR IN PLACE. PT DENIES 
NAUSE. RATED ABDOMINAL PAIN TO BE MILD 4/10 WITH RESPIRATIONS. HOB ELEVATED TO 45 DEG, SIDE 
RAILS UP X4, FALL RISK, AND DROPLET PRECAUTIONS MAINTAINED.

## 2019-02-27 VITALS — DIASTOLIC BLOOD PRESSURE: 63 MMHG | SYSTOLIC BLOOD PRESSURE: 92 MMHG

## 2019-02-27 VITALS — DIASTOLIC BLOOD PRESSURE: 58 MMHG | SYSTOLIC BLOOD PRESSURE: 89 MMHG

## 2019-02-27 VITALS — SYSTOLIC BLOOD PRESSURE: 128 MMHG | DIASTOLIC BLOOD PRESSURE: 74 MMHG

## 2019-02-27 VITALS — DIASTOLIC BLOOD PRESSURE: 63 MMHG | SYSTOLIC BLOOD PRESSURE: 98 MMHG

## 2019-02-27 VITALS — DIASTOLIC BLOOD PRESSURE: 63 MMHG | SYSTOLIC BLOOD PRESSURE: 110 MMHG

## 2019-02-27 VITALS — DIASTOLIC BLOOD PRESSURE: 69 MMHG | SYSTOLIC BLOOD PRESSURE: 96 MMHG

## 2019-02-27 VITALS — DIASTOLIC BLOOD PRESSURE: 65 MMHG | SYSTOLIC BLOOD PRESSURE: 108 MMHG

## 2019-02-27 VITALS — DIASTOLIC BLOOD PRESSURE: 54 MMHG | SYSTOLIC BLOOD PRESSURE: 90 MMHG

## 2019-02-27 VITALS — SYSTOLIC BLOOD PRESSURE: 78 MMHG | DIASTOLIC BLOOD PRESSURE: 45 MMHG

## 2019-02-27 LAB
ANION GAP SERPL CALCULATED.3IONS-SCNC: 7.9 MMOL/L (ref 8–16)
BUN SERPL-MCNC: 51 MG/DL (ref 7–18)
CHLORIDE SERPL-SCNC: 96 MMOL/L (ref 98–107)
CO2 SERPL-SCNC: 35.3 MMOL/L (ref 21–32)
CREAT SERPL-MCNC: 1.2 MG/DL (ref 0.6–1.3)
ERYTHROCYTE [DISTWIDTH] IN BLOOD BY AUTOMATED COUNT: 14.6 % (ref 11.6–13.7)
GFR SERPL CREATININE-BSD FRML MDRD: 61 ML/MIN (ref 90–?)
GLUCOSE SERPL-MCNC: 151 MG/DL (ref 74–106)
HCT VFR BLD AUTO: 39.3 % (ref 36–48)
HGB BLD-MCNC: 12.4 G/DL (ref 12–16)
LYMPHOCYTES NFR BLD MANUAL: 3 % (ref 20–46)
MAGNESIUM SERPL-MCNC: 2 MG/DL (ref 1.8–2.4)
MCH RBC QN AUTO: 29 PG (ref 27–31)
MCHC RBC AUTO-ENTMCNC: 32 G/DL (ref 33–37)
MCV RBC AUTO: 90.5 FL (ref 80–94)
MONOCYTES NFR BLD MANUAL: 7 % (ref 5–12)
PHOSPHATE SERPL-MCNC: 5.6 MG/DL (ref 2.5–4.9)
PLATELET # BLD AUTO: 282 K/UL (ref 140–450)
POTASSIUM SERPL-SCNC: 4.2 MMOL/L (ref 3.5–5.1)
RBC # BLD AUTO: 4.34 MIL/UL (ref 4.2–5.4)
SODIUM SERPL-SCNC: 135 MMOL/L (ref 136–145)
WBC # BLD AUTO: 9.9 K/UL (ref 4.8–10.8)

## 2019-02-27 RX ADMIN — INSULIN LISPRO PRN UNITS: 100 INJECTION, SOLUTION INTRAVENOUS; SUBCUTANEOUS at 11:50

## 2019-02-27 RX ADMIN — DOCUSATE SODIUM SCH MG: 100 CAPSULE, LIQUID FILLED ORAL at 08:26

## 2019-02-27 RX ADMIN — IPRATROPIUM BROMIDE AND ALBUTEROL SULFATE PRN ML: .5; 3 SOLUTION RESPIRATORY (INHALATION) at 02:55

## 2019-02-27 RX ADMIN — WATER SCH MG: 1 INJECTION INTRAMUSCULAR; INTRAVENOUS; SUBCUTANEOUS at 21:36

## 2019-02-27 RX ADMIN — DOCUSATE SODIUM SCH MG: 100 CAPSULE, LIQUID FILLED ORAL at 21:37

## 2019-02-27 RX ADMIN — IPRATROPIUM BROMIDE AND ALBUTEROL SULFATE SCH ML: .5; 3 SOLUTION RESPIRATORY (INHALATION) at 06:45

## 2019-02-27 RX ADMIN — FUROSEMIDE SCH MG: 10 INJECTION, SOLUTION INTRAMUSCULAR; INTRAVENOUS at 08:24

## 2019-02-27 RX ADMIN — PANTOPRAZOLE SODIUM SCH MG: 40 INJECTION, POWDER, FOR SOLUTION INTRAVENOUS at 08:24

## 2019-02-27 RX ADMIN — ATORVASTATIN CALCIUM SCH MG: 20 TABLET, FILM COATED ORAL at 08:25

## 2019-02-27 RX ADMIN — FUROSEMIDE SCH MG: 10 INJECTION, SOLUTION INTRAMUSCULAR; INTRAVENOUS at 16:28

## 2019-02-27 RX ADMIN — WATER SCH MG: 1 INJECTION INTRAMUSCULAR; INTRAVENOUS; SUBCUTANEOUS at 05:31

## 2019-02-27 RX ADMIN — CARVEDILOL SCH MG: 12.5 TABLET, FILM COATED ORAL at 08:26

## 2019-02-27 RX ADMIN — Medication SCH DEV: at 16:28

## 2019-02-27 RX ADMIN — WATER SCH MG: 1 INJECTION INTRAMUSCULAR; INTRAVENOUS; SUBCUTANEOUS at 12:50

## 2019-02-27 RX ADMIN — LISINOPRIL SCH MG: 10 TABLET ORAL at 09:00

## 2019-02-27 RX ADMIN — INSULIN LISPRO PRN UNITS: 100 INJECTION, SOLUTION INTRAVENOUS; SUBCUTANEOUS at 07:21

## 2019-02-27 RX ADMIN — Medication SCH DEV: at 11:30

## 2019-02-27 RX ADMIN — IPRATROPIUM BROMIDE AND ALBUTEROL SULFATE SCH ML: .5; 3 SOLUTION RESPIRATORY (INHALATION) at 14:09

## 2019-02-27 RX ADMIN — Medication SCH MG: at 08:25

## 2019-02-27 RX ADMIN — OSELTAMIVIR PHOSPHATE SCH MG: 75 CAPSULE ORAL at 21:36

## 2019-02-27 RX ADMIN — CARVEDILOL SCH MG: 12.5 TABLET, FILM COATED ORAL at 21:37

## 2019-02-27 RX ADMIN — INSULIN LISPRO PRN UNITS: 100 INJECTION, SOLUTION INTRAVENOUS; SUBCUTANEOUS at 16:29

## 2019-02-27 RX ADMIN — OSELTAMIVIR PHOSPHATE SCH MG: 75 CAPSULE ORAL at 08:27

## 2019-02-27 RX ADMIN — Medication SCH DEV: at 07:17

## 2019-02-27 RX ADMIN — IPRATROPIUM BROMIDE AND ALBUTEROL SULFATE SCH ML: .5; 3 SOLUTION RESPIRATORY (INHALATION) at 19:42

## 2019-02-27 RX ADMIN — Medication SCH DEV: at 20:53

## 2019-02-27 NOTE — NUR
PTT REPORTED 51.2, NO CHANGE. HEPARIN DRIP REMAINS  UNITS/HR. PT URINE OUTPUT OF 
1300ML, CLEAR AND LIGHT MADELINE IN COLOR. FAIR CATH CARE PROVIDED.

## 2019-02-27 NOTE — NUR
PT ABLE TO BRUSH TEETH AND REPOSITIONS HERSELF INDEPENDENTLY, BUT STILL APPEARS LETHARGIC 
WITH GENERALIZED WEAKNESS. UNCERTAIN IF PATIENT IS INCONTINENT OF BM, PT HAD SMALL SMEAR ON 
JC PAD AT START OF SHIFT AND SMALL SMEAR AT THIS TIME AND JC PAD CHANGED AGAIN. PT 
DARWIN NAUSEA AT THIS TIME.

## 2019-02-27 NOTE — NUR
CALLED DR. NAVA UPDATED ON PT CONDITION AND RT AND BEDSIDE.  TO PLACE ADDITIONAL ORDER OF 
SOLU-MEDROL .

## 2019-02-27 NOTE — NUR
PT IS SLEEPING, EASY TO AROUSE. APPEARS WITHOUT DISTRESS. AFEBRILE W/ TEMP 97.0, BP 92/59 
AND VSS. ABLE TO REPOSITION SELF IN BED. BLANKET GIVEN TO PATIENT AND HOT TEA.

## 2019-02-27 NOTE — NUR
RECEIVED REPORT FROM ICU NURSE LUIS-RN OVER THE PHONE. PT ARRIVED TO UNIT VIA WHEELCHAIR BY 
ICU NURSE ELEANOR-RN. PT RESTING IN BED, AOX3/4-  Uzbek SPEAKING, ON 4L/NC, WITH RIGHT HAND 
#20G AND LEFT AC #20G- BOTH SL. FAIR CATHETER IN PLACE. DISCUSSED PLAN OF CARE AND PT 
VERBALIZED UNDERSTANDING. NO S/S OF RESPIRATORY DISTRESS OR DISCOMFORT NOTED AT THIS TIME. 
VITAL SIGNS TAKEN AND BLOOD GLUCOSE 137- NO INSULIN COVERAGE NEEDED. BED IN LOWEST POSITION, 
BED BREAKS ON, BOTH SIDE RAILS UP WITH BOTH FALL AND DROPLET PRECAUTIONS IN PLACE. BEDSIDE 
TABLE AND CALL LIGHT ARE WITHIN REACH. WILL CONTINUE TO MONITOR.

## 2019-02-27 NOTE — NUR
RECEIVED REPORT FROM MORNING SHIFT BJ FORBES, PT IS SITTING UP IN BED, VSS, SATING 98-90%, ON 
NASAL CANNULA 4 LPM. PT WAS UP AT BEDSIDE AND AMBULATED TO BATHROOM, HAD BOWEL MOVEMENT. 
SKIN IS INTACT, MILD WEAKNESS. NO IVS INFUSING AT THIS TIME, PT HAS LEFT AC 20 GAUGE, AND R 
HAND 20 GAUGE PIV.

## 2019-02-27 NOTE — NUR
PT COMPLAINTS OF PAIN WHEN COUGHING/CHEST, RT VICTOR M CALLED AT BEDSIDE ADMINISTERING 
BREATHING TREATMENT.

## 2019-02-27 NOTE — NUR
SPOKE WITH SHAE FROM [facility] REGARDING 6AM SOLU-MEDROL, STATED TO FOLLOW-UP WITH DR. NAVA TO WHETHER HE WANTS TO GIVE ADDITIONAL/SCHEDULED MED. CALLED DR. NAVA, UPDATED ON PT 
CONDITION, STATED OKAY TO HOLD MED AT THIS TIME. WILL CARRY OUT.

## 2019-02-27 NOTE — NUR
PATIENT ALERT ORIENTED, SEMI FOWLERS POSITION IN BED, ON NASAL CANNULA O2 4L/MIN., NO SIGNS 
OF DISTRESS, SINUS RHYTHM ON MONITOR, BREAKFAST TRAY SERVED AND EATING INDEPENDENTLY AND 
TOLERATING DIET. WITH FAIR CATHETER WITH CLEAR YELLOW URINE NOTED IN THE UROBAG. PATIENT 
WITH PERIPHERAL LINES GAUGE 20 AT THE RIGH HAND WITH HEPARIN INFUSING 500 UNITS/HR., GAUGE 
20 LEFT ANTECUBITAL, BOTH SITES ASYMPTOMATIC. SKIN INTACT. CALL BELL WITHIN REACH

## 2019-02-27 NOTE — NUR
SCHEDULED MEDICATION GIVEN AND TOLERATED WELL. HEPARIN NOT GIVEN DUE TO APTT AT CRITICAL LAB 
VALUE OF 51.4. NO S/S OF RESPIRATORY DISTRESS OR DISCOMFORT NOTED AT THIS TIME. WILL 
CONTINUE TO MONITOR.

## 2019-02-28 VITALS — DIASTOLIC BLOOD PRESSURE: 64 MMHG | SYSTOLIC BLOOD PRESSURE: 106 MMHG

## 2019-02-28 VITALS — DIASTOLIC BLOOD PRESSURE: 74 MMHG | SYSTOLIC BLOOD PRESSURE: 113 MMHG

## 2019-02-28 VITALS — SYSTOLIC BLOOD PRESSURE: 123 MMHG | DIASTOLIC BLOOD PRESSURE: 70 MMHG

## 2019-02-28 VITALS — SYSTOLIC BLOOD PRESSURE: 121 MMHG | DIASTOLIC BLOOD PRESSURE: 78 MMHG

## 2019-02-28 VITALS — DIASTOLIC BLOOD PRESSURE: 74 MMHG | SYSTOLIC BLOOD PRESSURE: 104 MMHG

## 2019-02-28 VITALS — SYSTOLIC BLOOD PRESSURE: 103 MMHG | DIASTOLIC BLOOD PRESSURE: 62 MMHG

## 2019-02-28 LAB
ANION GAP SERPL CALCULATED.3IONS-SCNC: 1.4 MMOL/L (ref 8–16)
BASOPHILS # BLD AUTO: 0 K/UL (ref 0–0.22)
BASOPHILS NFR BLD AUTO: 0.3 % (ref 0–2)
BUN SERPL-MCNC: 47 MG/DL (ref 7–18)
CHLORIDE SERPL-SCNC: 95 MMOL/L (ref 98–107)
CO2 SERPL-SCNC: 43.8 MMOL/L (ref 21–32)
CREAT SERPL-MCNC: 1 MG/DL (ref 0.6–1.3)
EOSINOPHIL # BLD AUTO: 0 K/UL (ref 0–0.4)
EOSINOPHIL NFR BLD AUTO: 0 % (ref 0–4)
ERYTHROCYTE [DISTWIDTH] IN BLOOD BY AUTOMATED COUNT: 14.1 % (ref 11.6–13.7)
GFR SERPL CREATININE-BSD FRML MDRD: 75 ML/MIN (ref 90–?)
GLUCOSE SERPL-MCNC: 123 MG/DL (ref 74–106)
HCT VFR BLD AUTO: 38.9 % (ref 36–48)
HGB BLD-MCNC: 12.4 G/DL (ref 12–16)
LYMPHOCYTES # BLD AUTO: 0.7 K/UL (ref 2.5–16.5)
LYMPHOCYTES NFR BLD AUTO: 6 % (ref 20.5–51.1)
MAGNESIUM SERPL-MCNC: 2.1 MG/DL (ref 1.8–2.4)
MCH RBC QN AUTO: 28 PG (ref 27–31)
MCHC RBC AUTO-ENTMCNC: 32 G/DL (ref 33–37)
MCV RBC AUTO: 89.1 FL (ref 80–94)
MONOCYTES # BLD AUTO: 0.6 K/UL (ref 0.8–1)
MONOCYTES NFR BLD AUTO: 5.8 % (ref 1.7–9.3)
NEUTROPHILS # BLD AUTO: 9.6 K/UL (ref 1.8–7.7)
NEUTROPHILS NFR BLD AUTO: 87.9 % (ref 42.2–75.2)
PHOSPHATE SERPL-MCNC: 3.2 MG/DL (ref 2.5–4.9)
PLATELET # BLD AUTO: 275 K/UL (ref 140–450)
POTASSIUM SERPL-SCNC: 4.2 MMOL/L (ref 3.5–5.1)
RBC # BLD AUTO: 4.37 MIL/UL (ref 4.2–5.4)
SODIUM SERPL-SCNC: 136 MMOL/L (ref 136–145)
WBC # BLD AUTO: 10.9 K/UL (ref 4.8–10.8)

## 2019-02-28 RX ADMIN — INSULIN LISPRO PRN UNITS: 100 INJECTION, SOLUTION INTRAVENOUS; SUBCUTANEOUS at 12:54

## 2019-02-28 RX ADMIN — CARVEDILOL SCH MG: 12.5 TABLET, FILM COATED ORAL at 08:56

## 2019-02-28 RX ADMIN — FUROSEMIDE SCH MG: 10 INJECTION, SOLUTION INTRAMUSCULAR; INTRAVENOUS at 08:54

## 2019-02-28 RX ADMIN — OSELTAMIVIR PHOSPHATE SCH MG: 75 CAPSULE ORAL at 21:08

## 2019-02-28 RX ADMIN — Medication SCH DEV: at 11:30

## 2019-02-28 RX ADMIN — PANTOPRAZOLE SODIUM SCH MG: 40 INJECTION, POWDER, FOR SOLUTION INTRAVENOUS at 09:08

## 2019-02-28 RX ADMIN — WATER SCH MG: 1 INJECTION INTRAMUSCULAR; INTRAVENOUS; SUBCUTANEOUS at 08:54

## 2019-02-28 RX ADMIN — CARVEDILOL SCH MG: 12.5 TABLET, FILM COATED ORAL at 21:08

## 2019-02-28 RX ADMIN — ATORVASTATIN CALCIUM SCH MG: 20 TABLET, FILM COATED ORAL at 08:55

## 2019-02-28 RX ADMIN — OSELTAMIVIR PHOSPHATE SCH MG: 75 CAPSULE ORAL at 08:56

## 2019-02-28 RX ADMIN — LISINOPRIL SCH MG: 5 TABLET ORAL at 08:55

## 2019-02-28 RX ADMIN — Medication SCH DEV: at 17:14

## 2019-02-28 RX ADMIN — Medication SCH MG: at 08:55

## 2019-02-28 RX ADMIN — IPRATROPIUM BROMIDE AND ALBUTEROL SULFATE SCH ML: .5; 3 SOLUTION RESPIRATORY (INHALATION) at 19:55

## 2019-02-28 RX ADMIN — DOCUSATE SODIUM SCH MG: 100 CAPSULE, LIQUID FILLED ORAL at 08:55

## 2019-02-28 RX ADMIN — FUROSEMIDE SCH MG: 10 INJECTION, SOLUTION INTRAMUSCULAR; INTRAVENOUS at 17:15

## 2019-02-28 RX ADMIN — INSULIN LISPRO PRN UNITS: 100 INJECTION, SOLUTION INTRAVENOUS; SUBCUTANEOUS at 21:58

## 2019-02-28 RX ADMIN — Medication SCH DEV: at 07:42

## 2019-02-28 RX ADMIN — IPRATROPIUM BROMIDE AND ALBUTEROL SULFATE SCH ML: .5; 3 SOLUTION RESPIRATORY (INHALATION) at 13:34

## 2019-02-28 RX ADMIN — Medication SCH DEV: at 21:56

## 2019-02-28 RX ADMIN — DOCUSATE SODIUM SCH MG: 100 CAPSULE, LIQUID FILLED ORAL at 21:08

## 2019-02-28 RX ADMIN — IPRATROPIUM BROMIDE AND ALBUTEROL SULFATE SCH ML: .5; 3 SOLUTION RESPIRATORY (INHALATION) at 06:59

## 2019-02-28 NOTE — NUR
THROUGH  PATIENT REMOVING SUPPLEMENTAL OXYGEN DUE TO NASAL DRYNESS RCP ADDED 
HUMIDIFIER EARLINE SERRATO/RN AWARE

## 2019-02-28 NOTE — NUR
CALLED [facility]ID[facility]  TO REVIEW ABG SAMPLE REPORT "NO ANSWER" RCP TO ATTEMPT AT A LATER 
TIME PATIENT PRESENTING WITH NO SOB NOTED

## 2019-02-28 NOTE — NUR
SPOKE WITH PRIMARY COVERING MD DR. BRIDGES WHOM GAVE THE ORDER TO DISCONTINUE THE FAIR 
CATHETER. FAIR CATHETER WAS D/CD. BALLOON DEFLATED AND CATHETER WAS PULLED OUT INTACT. 
FAIR CATH HAD 1300MLS OF YELLOW CLEAR URINE. HAT WAS PROVIDED AT PT TOILET TO MEASURE OUT 
PUT. PT SAID SHE HAD A BM THIS MORNING. V/S AS FOLLOWS T 98.0 P 99 R 18 B/P 121/78 02 985 
WITH 2LITERS VIA N/C. PT HAS DIMINSHED LUNG SOUNDS AND INTERMITTENT COUGHING, BUT 
RESPIRATIONS ARE EVEN AND UNLABORED. PT NEEDS PROMPTING TO REMIND HER TO KEEP IN 
SUPPLIMENTAL OXYGEN 02 VIA N/C IN PLACE.

## 2019-02-28 NOTE — NUR
PT IS A Brazilian SPEAKING ONLY, SHE IS A FALLS RISKS AND HAS ALL FALLS PRECAUTIONS IN PLACE 
AS WELL AS DROPLET PRECAUTIONS FOR FLU A. PT APPEARS AGITATED BY HER FAIR CATHETER. SPOKE 
WITH PT USING  PHONES, PT SAID THAT SHE DOESN'T WANT FAIR CATHETER AND WANTS IT 
OUT NOW. PT EXPLAINED THROUGH  JAVIER, THAT THE REASON FOR HER FAIR CATHETER 
WAS SO THAT THE DOCTORS CAN MEASURE THE URINE OUTPUT ACCURATELY. PT WAS ALSO EXPLAINED THAT 
SHE WILL NEED TO USE A HAT TO VOID IN SO THAT THE URINE CAN BE MEASURED. PT VERBALIZED 
UNDERSTANDING. PT SAID THAT SHE WS ABLE TO AMBULATE TO TOILET WITH NO PROBLEM. PT ASKED IF 
SHE WAS OK AND IF SHE HAD ANY PAIN OR ANY OTHER CONCERNS. PRIMARY NURSE SAID SHE WILL SPEAK 
TO HER MD REGARDING TAKING OUT THE FAIR CATHETER. PT VERBALIZED UNDERSTANDING.

## 2019-02-28 NOTE — NUR
VITAL SIGNS TAKEN AND TOLERATED WELL. PT NOT RANDAL NASAL CANNULA 02SAT 88%. EDUCATED PT TO 
KEEP NASAL CANNULA ON TO ASSIST WITH OXYGENATION. NO S/S OF RESPIRATORY DISTRESS OR 
DISCOMFORT NOTED AT THIS TIME. WILL CONTINUE TO MONITOR.

## 2019-02-28 NOTE — NUR
RECEIVED BEDSIDE REPORT FROM BJ SAMUELS. PT STABLE, AWAKE, AND ALERT. NO SIGNS OF DISTRESS 
NOTED. DENIES PAIN OR SOB. ON 4L NC. NO REDNESS, SWELLING, OR INFLAMMATION NOTED ON IV SITE. 
BED IN LOWEST POSITION, BED ALARM ON. CALL BELL WITHIN REACH. SAFETY MEASURES IN PLACE. PLAN 
OF CARE REVIEWED.

## 2019-02-28 NOTE — NUR
PT FOUND WITHOUT 4L/NC WITH O2SAT 88%. EDUCATED PT ON KEEPING NASAL CANNULA ON AT ALL TIMES 
TO ASSIST WITH PROPER OXYGENATION. PT CONTINUES SLEEPING. NO S/S OF RESPIRATORY DISTRESS OR 
DISCOMFORT NOTED AT THIS TIME. WILL CONTINUE TO MONITOR.

## 2019-02-28 NOTE — NUR
BLOOD GLUCOSE 122- NO INSULIN COVERAGE NEEDED. NO S/S OF RESPIRATORY DISTRESS OR DISCOMFORT 
NOTED AT THIS TIME. WILL CONTINUE TO MONITOR.

## 2019-03-01 VITALS — DIASTOLIC BLOOD PRESSURE: 72 MMHG | SYSTOLIC BLOOD PRESSURE: 122 MMHG

## 2019-03-01 VITALS — SYSTOLIC BLOOD PRESSURE: 121 MMHG | DIASTOLIC BLOOD PRESSURE: 88 MMHG

## 2019-03-01 VITALS — DIASTOLIC BLOOD PRESSURE: 85 MMHG | SYSTOLIC BLOOD PRESSURE: 131 MMHG

## 2019-03-01 VITALS — SYSTOLIC BLOOD PRESSURE: 112 MMHG | DIASTOLIC BLOOD PRESSURE: 67 MMHG

## 2019-03-01 VITALS — SYSTOLIC BLOOD PRESSURE: 106 MMHG | DIASTOLIC BLOOD PRESSURE: 72 MMHG

## 2019-03-01 VITALS — SYSTOLIC BLOOD PRESSURE: 103 MMHG | DIASTOLIC BLOOD PRESSURE: 66 MMHG

## 2019-03-01 LAB
ALBUMIN FLD-MCNC: 2.9 G/DL (ref 3.4–5)
ANION GAP SERPL CALCULATED.3IONS-SCNC: 2.1 MMOL/L (ref 8–16)
ANION GAP SERPL CALCULATED.3IONS-SCNC: 5.3 MMOL/L (ref 8–16)
AST SERPL-CCNC: 20 U/L (ref 15–37)
BASOPHILS # BLD AUTO: 0 K/UL (ref 0–0.22)
BASOPHILS NFR BLD AUTO: 0 % (ref 0–2)
BILIRUB SERPL-MCNC: 0.3 MG/DL (ref 0–1)
BUN SERPL-MCNC: 36 MG/DL (ref 7–18)
BUN SERPL-MCNC: 37 MG/DL (ref 7–18)
CHLORIDE SERPL-SCNC: 95 MMOL/L (ref 98–107)
CHLORIDE SERPL-SCNC: 96 MMOL/L (ref 98–107)
CO2 SERPL-SCNC: 42.3 MMOL/L (ref 21–32)
CO2 SERPL-SCNC: 44.1 MMOL/L (ref 21–32)
CREAT SERPL-MCNC: 0.8 MG/DL (ref 0.6–1.3)
CREAT SERPL-MCNC: 0.8 MG/DL (ref 0.6–1.3)
EOSINOPHIL # BLD AUTO: 0.1 K/UL (ref 0–0.4)
EOSINOPHIL NFR BLD AUTO: 0.5 % (ref 0–4)
ERYTHROCYTE [DISTWIDTH] IN BLOOD BY AUTOMATED COUNT: 14.2 % (ref 11.6–13.7)
GFR SERPL CREATININE-BSD FRML MDRD: 97 ML/MIN (ref 90–?)
GFR SERPL CREATININE-BSD FRML MDRD: 97 ML/MIN (ref 90–?)
GLUCOSE SERPL-MCNC: 96 MG/DL (ref 74–106)
GLUCOSE SERPL-MCNC: 97 MG/DL (ref 74–106)
HCT VFR BLD AUTO: 40.9 % (ref 36–48)
HGB BLD-MCNC: 13 G/DL (ref 12–16)
LYMPHOCYTES # BLD AUTO: 1.8 K/UL (ref 2.5–16.5)
LYMPHOCYTES NFR BLD AUTO: 15.4 % (ref 20.5–51.1)
MAGNESIUM SERPL-MCNC: 2.2 MG/DL (ref 1.8–2.4)
MCH RBC QN AUTO: 28 PG (ref 27–31)
MCHC RBC AUTO-ENTMCNC: 32 G/DL (ref 33–37)
MCV RBC AUTO: 88.4 FL (ref 80–94)
MONOCYTES # BLD AUTO: 1.7 K/UL (ref 0.8–1)
MONOCYTES NFR BLD AUTO: 13.9 % (ref 1.7–9.3)
NEUTROPHILS # BLD AUTO: 8.4 K/UL (ref 1.8–7.7)
NEUTROPHILS NFR BLD AUTO: 70.2 % (ref 42.2–75.2)
PHOSPHATE SERPL-MCNC: 2.4 MG/DL (ref 2.5–4.9)
PLATELET # BLD AUTO: 305 K/UL (ref 140–450)
POTASSIUM SERPL-SCNC: 3.6 MMOL/L (ref 3.5–5.1)
POTASSIUM SERPL-SCNC: 4.2 MMOL/L (ref 3.5–5.1)
RBC # BLD AUTO: 4.63 MIL/UL (ref 4.2–5.4)
SODIUM SERPL-SCNC: 137 MMOL/L (ref 136–145)
SODIUM SERPL-SCNC: 140 MMOL/L (ref 136–145)
WBC # BLD AUTO: 12 K/UL (ref 4.8–10.8)

## 2019-03-01 RX ADMIN — Medication SCH DEV: at 06:27

## 2019-03-01 RX ADMIN — IPRATROPIUM BROMIDE AND ALBUTEROL SULFATE SCH ML: .5; 3 SOLUTION RESPIRATORY (INHALATION) at 13:46

## 2019-03-01 RX ADMIN — Medication SCH MG: at 09:03

## 2019-03-01 RX ADMIN — FUROSEMIDE SCH MG: 10 INJECTION, SOLUTION INTRAMUSCULAR; INTRAVENOUS at 09:03

## 2019-03-01 RX ADMIN — Medication SCH DEV: at 20:58

## 2019-03-01 RX ADMIN — PANTOPRAZOLE SODIUM SCH MG: 40 INJECTION, POWDER, FOR SOLUTION INTRAVENOUS at 09:03

## 2019-03-01 RX ADMIN — OSELTAMIVIR PHOSPHATE SCH MG: 75 CAPSULE ORAL at 21:10

## 2019-03-01 RX ADMIN — DOCUSATE SODIUM SCH MG: 100 CAPSULE, LIQUID FILLED ORAL at 21:10

## 2019-03-01 RX ADMIN — IPRATROPIUM BROMIDE AND ALBUTEROL SULFATE SCH ML: .5; 3 SOLUTION RESPIRATORY (INHALATION) at 19:26

## 2019-03-01 RX ADMIN — CARVEDILOL SCH MG: 12.5 TABLET, FILM COATED ORAL at 21:10

## 2019-03-01 RX ADMIN — Medication SCH DEV: at 11:58

## 2019-03-01 RX ADMIN — WATER SCH MG: 1 INJECTION INTRAMUSCULAR; INTRAVENOUS; SUBCUTANEOUS at 09:02

## 2019-03-01 RX ADMIN — IPRATROPIUM BROMIDE AND ALBUTEROL SULFATE SCH ML: .5; 3 SOLUTION RESPIRATORY (INHALATION) at 07:32

## 2019-03-01 RX ADMIN — Medication SCH DEV: at 16:52

## 2019-03-01 RX ADMIN — ATORVASTATIN CALCIUM SCH MG: 20 TABLET, FILM COATED ORAL at 09:04

## 2019-03-01 RX ADMIN — CARVEDILOL SCH MG: 12.5 TABLET, FILM COATED ORAL at 09:05

## 2019-03-01 RX ADMIN — DOCUSATE SODIUM SCH MG: 100 CAPSULE, LIQUID FILLED ORAL at 09:04

## 2019-03-01 RX ADMIN — LISINOPRIL SCH MG: 5 TABLET ORAL at 09:04

## 2019-03-01 RX ADMIN — OSELTAMIVIR PHOSPHATE SCH MG: 75 CAPSULE ORAL at 09:04

## 2019-03-01 NOTE — NUR
RECEIVED FROM AM SHIFT NURSE. PT CAME FROM BATHROOM. O2 SAT WENT DOWN TO 90% PLACED PT BACK 
TO 12 AT 2 LPM.

## 2019-03-01 NOTE — NUR
ABG DRAWN WITHOUT INCIDENT. RESULTS REPORTED TO DR. MURRAY. NO CHANGES TO BE MADE AT THIS 
TIME. WILL KEEP ENCOURAGING USE OF BIPAP AND SUPPLEMENTAL OXYGEN TO MAINTAIN ADEQUATE 
VENTILATION AND OXYGENATION. WILL CONTINUE TO MONITOR.

## 2019-03-01 NOTE — NUR
PATIENT REMOVED BIPAP AGAIN. CONTINUOUSLY REMOVES BIPAP AND NASAL CANNULA. PATIENT ON 2L NC. 
WILL CONTINUE TO MONITOR.

## 2019-03-01 NOTE — NUR
PT NON COMPLIANT TO BIPAP. ANXIOUS AND AMBULATING BACK AND FORTH TO THE RSSTROOM. TAKING OFF 
BIPAP. O2 SAT GOING DOWN.AT THIS TIME HER O2 SAT IS 99%.WILL ADMINISTER ATIVAN TONIGHT AS 
ORDERED.

## 2019-03-01 NOTE — NUR
PT IN BED ASLEEP BUT AROUSABLE TO TOUCH. NO S/S OF PAIN OR DISTRESS NOTED. PT VOIDED ANOTHER 
100MLS OF URINE. V/S AS FOLLOWS T 97.3 P 101 R 18 B/P 121/88 02 93 WITH 3LITERS OF 
SUPPLEMENTAL 02 F/S IS 90 NO COVERAGE NEEDED.

## 2019-03-01 NOTE — NUR
PATIENT NOT COMPLIANT WITH BIPAP MACHINE. PULLED TUBING OUT FROM MASK. PLACED PATIENT ON 2L 
NC. WILL CONTINUE TO MONITOR.

## 2019-03-01 NOTE — NUR
RN AT BEDSIDE PATIENT REMOVED BIPAP TO MASK PLACED PATIENT BACK ON GOOD CHEST RISE 
TOLERATING WELL WITHOUT INCIDENT

## 2019-03-01 NOTE — NUR
PATIENT AMBULATING FROM BATHROOM OFF SUPPLEMENTAL OXYGEN SATURATION 90% POST HHN THERAPY 
PLACED PATIENT BACK ON SUPPLEMENTAL OXYGEN AT 2 LPM VIA NC

## 2019-03-01 NOTE — NUR
RECEIVED PATIENT ON ROOM AIR, PULSE OX SAT 88%. PLACED PATIENT BACK ON 2L NC. PULSE OX SAT, 
96%. SCHEDULED BREATHING TREATMENT ADMINISTERED. TOLERATED TX WELL, NO ADVERSE SIDE EFFECTS. 
PLACED PATIENT BACK ON 2L NC. NO RESPIRATORY DISTRESS NOTED. WILL CONTINUE TO MONITOR.

## 2019-03-01 NOTE — NUR
RECEIVED BEDSIDE REPORT AM SHIFT NURSE. PT WAS WITH RT AT BEDSIDE. EARLIER SHE CAME FROM 
BATHROOM AND O2 SAT WENT DOWN TO 90%. RT PLACED 02 AT 8 L NOW. NO RESPIRATORY DISTRESS AT 
THIS TIME. NO REDNESS, SWELLING, OR INFLAMMATION NOTED ON IV SITE. ON 2 IV'S R HAND ON LEFT 
AC, BOTH ON SL. NO COMPLAINTS OF PAIN.BED IN LOWEST POSITION, BED ALARM ON. CALL BELL WITHIN 
REACH. SAFETY MEASURES IN PLACE. PLAN OF CARE REVIEWED.

## 2019-03-01 NOTE — NUR
3/1/19 

RD INITIAL ASSESSMENT COMPLETED



PLEASE REFER TO NUTRITION ASSESSMENT UNDER CARE ACTIVITY FOR ESTIMATED NUTRITIONAL NEEDS. 



1. CONTINUE CCHO 60 GM DIET 



2. RECOMMEND ADDING CARDIAC TO CURRENT DIET ORDER D/T PT WITH CARDIOMYOPATHY



-NOTE PATIENT IS CONSUMING 100% OF ESTIAMTED KCAL AND PROTEIN NEEDS



3. PATIENT DECLINED DIET EDUCATION



4. RD TO FOLLOW-UP 3-5 DAYS, MODERATE RISK 



DEE COR[address] RD

## 2019-03-01 NOTE — NUR
RECEIVED BEDSIDE REPORT FROM BJ LIN. PT STABLE, SLEEPING, BUT EASILY AROUSABLE. MOTHER AT 
THE BEDSIDE. NO SIGNS OF DISTRESS NOTED. ON 2L O2 VIA NC. NO REDNESS, SWELLING, OR 
INFLAMMATION NOTED ON IV SITE. BED IN LOWEST POSITION. CALL BELL WITHIN REACH. SAFETY 
MEASURES IN PLACE. PLAN OF CARE REVIEWED. 

-------------------------------------------------------------------------------

Addendum: 03/01/19 at 0741 by Natacha Savage RN

-------------------------------------------------------------------------------

RECEIVED BEDSIDE REPORT FROM BJ LIN. PT STABLE, SLEEPING, BUT EASILY AROUSABLE. NO SIGNS 
OF DISTRESS NOTED. PT REFUSED O2 VIA NC. NO REDNESS, SWELLING, OR INFLAMMATION NOTED ON IV 
SITE. BED IN LOWEST POSITION. CALL BELL WITHIN REACH. SAFETY MEASURES IN PLACE. PLAN OF CARE 
REVIEWED.

## 2019-03-01 NOTE — NUR
PT IN BED SLEEPING BUT AROUSABLE TO NAME. NO S/S OF PAIN OR DISTRESS NOTED. PT AMBULATED TO 
TOILET AND BACK, SHE VOIDED 100MLS OF YELLOW SLIGHTLY CLOUDY URINE. V/S AS FOLLOWS T 98.8 P 
90 R 20 B/P 122/72 02 92% WITH 3LITERS OF 02 VIA N/C.

## 2019-03-02 VITALS — DIASTOLIC BLOOD PRESSURE: 83 MMHG | SYSTOLIC BLOOD PRESSURE: 130 MMHG

## 2019-03-02 VITALS — DIASTOLIC BLOOD PRESSURE: 65 MMHG | SYSTOLIC BLOOD PRESSURE: 108 MMHG

## 2019-03-02 VITALS — SYSTOLIC BLOOD PRESSURE: 110 MMHG | DIASTOLIC BLOOD PRESSURE: 68 MMHG

## 2019-03-02 VITALS — DIASTOLIC BLOOD PRESSURE: 61 MMHG | SYSTOLIC BLOOD PRESSURE: 105 MMHG

## 2019-03-02 VITALS — SYSTOLIC BLOOD PRESSURE: 116 MMHG | DIASTOLIC BLOOD PRESSURE: 70 MMHG

## 2019-03-02 VITALS — DIASTOLIC BLOOD PRESSURE: 53 MMHG | SYSTOLIC BLOOD PRESSURE: 101 MMHG

## 2019-03-02 LAB
ANION GAP SERPL CALCULATED.3IONS-SCNC: 4.8 MMOL/L (ref 8–16)
BASOPHILS # BLD AUTO: 0.1 K/UL (ref 0–0.22)
BASOPHILS NFR BLD AUTO: 0.7 % (ref 0–2)
BUN SERPL-MCNC: 30 MG/DL (ref 7–18)
CHLORIDE SERPL-SCNC: 98 MMOL/L (ref 98–107)
CO2 SERPL-SCNC: 39.8 MMOL/L (ref 21–32)
CREAT SERPL-MCNC: 0.8 MG/DL (ref 0.6–1.3)
EOSINOPHIL # BLD AUTO: 0.1 K/UL (ref 0–0.4)
EOSINOPHIL NFR BLD AUTO: 1 % (ref 0–4)
ERYTHROCYTE [DISTWIDTH] IN BLOOD BY AUTOMATED COUNT: 14 % (ref 11.6–13.7)
GFR SERPL CREATININE-BSD FRML MDRD: 97 ML/MIN (ref 90–?)
GLUCOSE SERPL-MCNC: 99 MG/DL (ref 74–106)
HCT VFR BLD AUTO: 40.6 % (ref 36–48)
HGB BLD-MCNC: 13 G/DL (ref 12–16)
LYMPHOCYTES # BLD AUTO: 1.9 K/UL (ref 2.5–16.5)
LYMPHOCYTES NFR BLD AUTO: 15.9 % (ref 20.5–51.1)
MAGNESIUM SERPL-MCNC: 2.2 MG/DL (ref 1.8–2.4)
MCH RBC QN AUTO: 28 PG (ref 27–31)
MCHC RBC AUTO-ENTMCNC: 32 G/DL (ref 33–37)
MCV RBC AUTO: 87.9 FL (ref 80–94)
MONOCYTES # BLD AUTO: 1.1 K/UL (ref 0.8–1)
MONOCYTES NFR BLD AUTO: 9.6 % (ref 1.7–9.3)
NEUTROPHILS # BLD AUTO: 8.5 K/UL (ref 1.8–7.7)
NEUTROPHILS NFR BLD AUTO: 72.8 % (ref 42.2–75.2)
PHOSPHATE SERPL-MCNC: 3.1 MG/DL (ref 2.5–4.9)
PLATELET # BLD AUTO: 315 K/UL (ref 140–450)
POTASSIUM SERPL-SCNC: 3.6 MMOL/L (ref 3.5–5.1)
RBC # BLD AUTO: 4.62 MIL/UL (ref 4.2–5.4)
SODIUM SERPL-SCNC: 139 MMOL/L (ref 136–145)
WBC # BLD AUTO: 11.7 K/UL (ref 4.8–10.8)

## 2019-03-02 RX ADMIN — Medication SCH DEV: at 17:25

## 2019-03-02 RX ADMIN — OSELTAMIVIR PHOSPHATE SCH MG: 75 CAPSULE ORAL at 09:21

## 2019-03-02 RX ADMIN — FUROSEMIDE SCH MG: 10 INJECTION, SOLUTION INTRAMUSCULAR; INTRAVENOUS at 09:21

## 2019-03-02 RX ADMIN — DOCUSATE SODIUM SCH MG: 100 CAPSULE, LIQUID FILLED ORAL at 09:22

## 2019-03-02 RX ADMIN — DOCUSATE SODIUM SCH MG: 100 CAPSULE, LIQUID FILLED ORAL at 21:33

## 2019-03-02 RX ADMIN — ATORVASTATIN CALCIUM SCH MG: 20 TABLET, FILM COATED ORAL at 09:21

## 2019-03-02 RX ADMIN — INSULIN LISPRO PRN UNITS: 100 INJECTION, SOLUTION INTRAVENOUS; SUBCUTANEOUS at 21:30

## 2019-03-02 RX ADMIN — CARVEDILOL SCH MG: 12.5 TABLET, FILM COATED ORAL at 21:32

## 2019-03-02 RX ADMIN — CARVEDILOL SCH MG: 12.5 TABLET, FILM COATED ORAL at 09:22

## 2019-03-02 RX ADMIN — IPRATROPIUM BROMIDE AND ALBUTEROL SULFATE SCH ML: .5; 3 SOLUTION RESPIRATORY (INHALATION) at 07:32

## 2019-03-02 RX ADMIN — Medication SCH DEV: at 21:26

## 2019-03-02 RX ADMIN — INSULIN LISPRO PRN UNITS: 100 INJECTION, SOLUTION INTRAVENOUS; SUBCUTANEOUS at 12:22

## 2019-03-02 RX ADMIN — DEXTROSE SCH MLS/HR: 50 INJECTION, SOLUTION INTRAVENOUS at 14:54

## 2019-03-02 RX ADMIN — IPRATROPIUM BROMIDE AND ALBUTEROL SULFATE SCH ML: .5; 3 SOLUTION RESPIRATORY (INHALATION) at 13:25

## 2019-03-02 RX ADMIN — IPRATROPIUM BROMIDE AND ALBUTEROL SULFATE SCH ML: .5; 3 SOLUTION RESPIRATORY (INHALATION) at 19:46

## 2019-03-02 RX ADMIN — Medication SCH MG: at 09:22

## 2019-03-02 RX ADMIN — LISINOPRIL SCH MG: 5 TABLET ORAL at 09:22

## 2019-03-02 RX ADMIN — INSULIN LISPRO PRN UNITS: 100 INJECTION, SOLUTION INTRAVENOUS; SUBCUTANEOUS at 17:28

## 2019-03-02 RX ADMIN — Medication SCH DEV: at 12:20

## 2019-03-02 RX ADMIN — Medication SCH DEV: at 07:04

## 2019-03-02 RX ADMIN — WATER SCH MG: 1 INJECTION INTRAMUSCULAR; INTRAVENOUS; SUBCUTANEOUS at 09:21

## 2019-03-02 RX ADMIN — PANTOPRAZOLE SODIUM SCH MG: 40 INJECTION, POWDER, FOR SOLUTION INTRAVENOUS at 09:20

## 2019-03-02 NOTE — NUR
RT AND RN HAD TRIED TO CONVINCE PT PUT BACK THE BIPAP. EXPLAINED THE RISKS AND BENEFITS, BUT 
PT KEEPS ON PULLING OFF THE BIPAP MACHINE. PT IS NON COMPLIANT TO BIPAP. DR. CYRUS CHOW.

## 2019-03-02 NOTE — NUR
Attempted to place bipap on patient for the night, pt refused, placed on 2lpm NC but pt non 
compliant and keeps taking nasal cannula off, no resp distress or SOB noted at this time, 
will cont to monitor.

## 2019-03-02 NOTE — NUR
FOUND PATIENT TO HAVE REMOVED NASAL CANNULA AGAIN. FAMILY AT BEDSIDE. SCHEDULED BREATHING 
TREATMENT ADMINISTERED. TOLERATED TX WELL, NO ADVERSE SIDE EFFECTS. PLACED PATIENT ON 2L NC. 
EXPLAINED TO PATIENT AND FAMILY IT IS IMPORTANT FOR HER TO WEAR OXYGEN DURING DAY AND BIPAP 
AT NIGHT. NO RESPIRATORY DISTRESS NOTED AT THIS TIME. WILL CONTINUE TO MONITOR.

## 2019-03-02 NOTE — NUR
RECEIVED PATIENT ON ROOM AIR, PULSE OX SAT 87%. SCHEDULED BREATHING TREATMENT ADMINISTERED. 
TOLERATED TX WELL, NO ADVERSE SIDE EFFECTS. PATIENT ON CONTINUOUS PULSE OX MONITOR. PLACED 
PATIENT ON 2L NC PER OXYGEN ORDER TO MAINTAIN SATURATION >92%. REMINDED PATIENT TO LEAVE 
NASAL CANNULA ON. NO DISTRESS NOTED AT THIS TIME. WILL CONTINUE TO MONITOR.

## 2019-03-02 NOTE — NUR
PATIENT AGAIN HAS REMOVED BIPAP TO MASK PATIENT IS NON COMPLAINT WITH THERAPY PLACED ON 
HUMIDIFIED SUPPLEMENTAL OXYGEN AT 2 LPM VIA NC CALLED DR. BRIDGES  AND IS AWARE 
NOTIFIED DEJON/RN

## 2019-03-02 NOTE — NUR
RECEIVED PATIENT FROM AM NURSE. ASLEEP BUT AROUSABLE .PT CAN AMBULATE TO THE BATHROOM. ON 
ROOM AIR, PULSE OX SAT 99% AT 2 LPM VIA NC. NO S/S'S OF RESPIRATORY DISTRESS. NO COMPLAINTS 
OF PAIN. OXYGEN ORDER TO MAINTAIN SATURATION >92%. REMINDED PATIENT TO LEAVE NASAL CANNULA 
ON. NO DISTRESS NOTED AT THIS TIME. WILL CONTINUE TO MONITOR.

## 2019-03-02 NOTE — NUR
PT HAD BIPAP ON AND OFF, AT 0400AM PT STILL HAD BIPAP ON. BUT AGAIN SHE TOOKED IT OFF. DR. BRIDGES AWARE. PT EXPLAINED. STILL NON-COMPLIANT. PUT HER AT 2 LPM O2 VIA NC. O2 AT 98% WITH 
O2 ON.

## 2019-03-02 NOTE — NUR
RECEIVED BEDSIDE REPORT FROM BJ ASHFORD. PT STABLE, SLEEPING, BUT EASILY AROUSABLE. NO SIGNS 
OF DISTRESS NOTED. NO SOB NOTED. NO REDNESS, SWELLING, OR INFLAMMATION NOTED ON IV SITE. 
CALL BELL WITHIN REACH. BED IN LOWEST POSITION. SAFETY MEASURES IN PLACE. PLAN OF CARE 
REVIEWED.

## 2019-03-02 NOTE — NUR
CONTINUOUS PULSE OX ALARMING. PATIENT REMOVED NASAL CANNULA. SATURATION DROPPED TO  87%. 
PLACED PATIENT BACK ON NASAL CANNULA. PULSE OX SAT WENT UP TO 92%. REMINDED PATIENT TO KEEP 
NASAL CANNULA IN PLACE TO MAINTAIN ADEQUATE OXYGENATION.

## 2019-03-03 VITALS — SYSTOLIC BLOOD PRESSURE: 131 MMHG | DIASTOLIC BLOOD PRESSURE: 91 MMHG

## 2019-03-03 VITALS — DIASTOLIC BLOOD PRESSURE: 65 MMHG | SYSTOLIC BLOOD PRESSURE: 102 MMHG

## 2019-03-03 VITALS — DIASTOLIC BLOOD PRESSURE: 63 MMHG | SYSTOLIC BLOOD PRESSURE: 110 MMHG

## 2019-03-03 VITALS — DIASTOLIC BLOOD PRESSURE: 88 MMHG | SYSTOLIC BLOOD PRESSURE: 138 MMHG

## 2019-03-03 VITALS — SYSTOLIC BLOOD PRESSURE: 112 MMHG | DIASTOLIC BLOOD PRESSURE: 62 MMHG

## 2019-03-03 RX ADMIN — ATORVASTATIN CALCIUM SCH MG: 20 TABLET, FILM COATED ORAL at 09:35

## 2019-03-03 RX ADMIN — IPRATROPIUM BROMIDE AND ALBUTEROL SULFATE SCH ML: .5; 3 SOLUTION RESPIRATORY (INHALATION) at 07:19

## 2019-03-03 RX ADMIN — Medication SCH DEV: at 21:21

## 2019-03-03 RX ADMIN — CARVEDILOL SCH MG: 12.5 TABLET, FILM COATED ORAL at 09:35

## 2019-03-03 RX ADMIN — Medication SCH DEV: at 16:27

## 2019-03-03 RX ADMIN — LISINOPRIL SCH MG: 5 TABLET ORAL at 09:35

## 2019-03-03 RX ADMIN — DOCUSATE SODIUM SCH MG: 100 CAPSULE, LIQUID FILLED ORAL at 09:34

## 2019-03-03 RX ADMIN — INSULIN LISPRO PRN UNITS: 100 INJECTION, SOLUTION INTRAVENOUS; SUBCUTANEOUS at 17:02

## 2019-03-03 RX ADMIN — Medication SCH DEV: at 07:32

## 2019-03-03 RX ADMIN — DOCUSATE SODIUM SCH MG: 100 CAPSULE, LIQUID FILLED ORAL at 21:12

## 2019-03-03 RX ADMIN — Medication SCH DEV: at 11:30

## 2019-03-03 RX ADMIN — CARVEDILOL SCH MG: 12.5 TABLET, FILM COATED ORAL at 21:12

## 2019-03-03 RX ADMIN — IPRATROPIUM BROMIDE AND ALBUTEROL SULFATE SCH ML: .5; 3 SOLUTION RESPIRATORY (INHALATION) at 13:27

## 2019-03-03 RX ADMIN — INSULIN LISPRO PRN UNITS: 100 INJECTION, SOLUTION INTRAVENOUS; SUBCUTANEOUS at 21:20

## 2019-03-03 RX ADMIN — PANTOPRAZOLE SODIUM SCH MG: 40 INJECTION, POWDER, FOR SOLUTION INTRAVENOUS at 09:34

## 2019-03-03 RX ADMIN — WATER SCH MG: 1 INJECTION INTRAMUSCULAR; INTRAVENOUS; SUBCUTANEOUS at 09:34

## 2019-03-03 RX ADMIN — Medication SCH MG: at 09:34

## 2019-03-03 RX ADMIN — Medication SCH EACH: at 09:34

## 2019-03-03 RX ADMIN — FUROSEMIDE SCH MG: 10 INJECTION, SOLUTION INTRAMUSCULAR; INTRAVENOUS at 09:34

## 2019-03-03 RX ADMIN — IPRATROPIUM BROMIDE AND ALBUTEROL SULFATE SCH ML: .5; 3 SOLUTION RESPIRATORY (INHALATION) at 19:42

## 2019-03-03 RX ADMIN — DEXTROSE SCH MLS/HR: 50 INJECTION, SOLUTION INTRAVENOUS at 14:32

## 2019-03-03 NOTE — NUR
PT ASLEEP, NO COMPLAINTS AT THIS TIME. NO S/S'S OF RESPIRATORY DISTRESS. W/ 02 AT 2L PM 
NASAL CANNULA ON. WILL CONTINUE TO MONITOR

## 2019-03-03 NOTE — NUR
RECEIVED PATIENT FROM AM NURSE. ASLEEP BUT AROUSABLE. PT CAN AMBULATE TO THE BATHROOM. PULSE 
OX SAT 98% AT 2L/MIN VIA NC. NO S/S OF RESPIRATORY DISTRESS. NO COMPLAINTS OF PAIN. OXYGEN 
ORDER TO MAINTAIN SATURATION >92%. REMINDED PATIENT TO LEAVE NASAL CANNULA ON.  NO DISTRESS 
NOTED AT THIS TIME. WILL CONTINUE TO MONITOR.

## 2019-03-03 NOTE — NUR
I ASKED PT WHAT HER LIVING STATUS IS AND PT STATED THAT SHE IS HOMELESS. SHE STATED THAT SHE 
MOVES AROUND FROM HOUSE TO HOUSE WITH FRIENDS AND DOES NOT ALWAYS HAVE A PLACE TO STAY. WILL 
GIVE FOOD AND CLOTHING AT D/C.

## 2019-03-03 NOTE — NUR
RECEIVED PATIENT FROM AM NURSE. ASLEEP BUT AROUSABLE .PT CAN AMBULATE TO THE BATHROOM. ON 
ROOM AIR, PULSE OX SAT 97% AT 2L/MIN VIA NC. NO S/S OF RESPIRATORY DISTRESS. NO COMPLAINTS 
OF PAIN. OXYGEN ORDER TO MAINTAIN SATURATION >92%. REMINDED PATIENT TO LEAVE NASAL CANNULA 
ON. PER NIGHT SHIFT RN, PT IS NON-COMPLIANT. NO DISTRESS NOTED AT THIS TIME. WILL CONTINUE 
TO MONITOR.

## 2019-03-03 NOTE — NUR
PT IS BEING NON-COMPLIANT WITH KEEPING NASAL CANNULA ON. I EXPLAINED THE RISKS AND PT 
VERBALIZED UNDERSTANDING. PT O2 AT 93% AT THIS TIME.

## 2019-03-03 NOTE — NUR
PT TOOK NASAL CANNULA OFF. PUT IT BACK ON PLACE. CHECKED 02 SAT, AT 98% AFTER  PUTTING THE 
NC ON.WILL CONTINUE TO MONITOR.

## 2019-03-04 VITALS — SYSTOLIC BLOOD PRESSURE: 124 MMHG | DIASTOLIC BLOOD PRESSURE: 72 MMHG

## 2019-03-04 VITALS — DIASTOLIC BLOOD PRESSURE: 79 MMHG | SYSTOLIC BLOOD PRESSURE: 130 MMHG

## 2019-03-04 VITALS — SYSTOLIC BLOOD PRESSURE: 108 MMHG | DIASTOLIC BLOOD PRESSURE: 70 MMHG

## 2019-03-04 LAB
ANION GAP SERPL CALCULATED.3IONS-SCNC: 4.3 MMOL/L (ref 8–16)
BASOPHILS # BLD AUTO: 0 K/UL (ref 0–0.22)
BASOPHILS NFR BLD AUTO: 0.4 % (ref 0–2)
BUN SERPL-MCNC: 29 MG/DL (ref 7–18)
CHLORIDE SERPL-SCNC: 98 MMOL/L (ref 98–107)
CO2 SERPL-SCNC: 40.1 MMOL/L (ref 21–32)
CREAT SERPL-MCNC: 1 MG/DL (ref 0.6–1.3)
EOSINOPHIL # BLD AUTO: 0.3 K/UL (ref 0–0.4)
EOSINOPHIL NFR BLD AUTO: 3 % (ref 0–4)
ERYTHROCYTE [DISTWIDTH] IN BLOOD BY AUTOMATED COUNT: 13.9 % (ref 11.6–13.7)
GFR SERPL CREATININE-BSD FRML MDRD: 75 ML/MIN (ref 90–?)
GLUCOSE SERPL-MCNC: 95 MG/DL (ref 74–106)
HCT VFR BLD AUTO: 37.1 % (ref 36–48)
HGB BLD-MCNC: 12.2 G/DL (ref 12–16)
LYMPHOCYTES # BLD AUTO: 1.8 K/UL (ref 2.5–16.5)
LYMPHOCYTES NFR BLD AUTO: 19.4 % (ref 20.5–51.1)
MAGNESIUM SERPL-MCNC: 2.1 MG/DL (ref 1.8–2.4)
MCH RBC QN AUTO: 29 PG (ref 27–31)
MCHC RBC AUTO-ENTMCNC: 33 G/DL (ref 33–37)
MCV RBC AUTO: 86.8 FL (ref 80–94)
MONOCYTES # BLD AUTO: 0.9 K/UL (ref 0.8–1)
MONOCYTES NFR BLD AUTO: 9.2 % (ref 1.7–9.3)
NEUTROPHILS # BLD AUTO: 6.4 K/UL (ref 1.8–7.7)
NEUTROPHILS NFR BLD AUTO: 68 % (ref 42.2–75.2)
PHOSPHATE SERPL-MCNC: 3.7 MG/DL (ref 2.5–4.9)
PLATELET # BLD AUTO: 367 K/UL (ref 140–450)
POTASSIUM SERPL-SCNC: 4.4 MMOL/L (ref 3.5–5.1)
RBC # BLD AUTO: 4.27 MIL/UL (ref 4.2–5.4)
SODIUM SERPL-SCNC: 138 MMOL/L (ref 136–145)
WBC # BLD AUTO: 9.4 K/UL (ref 4.8–10.8)

## 2019-03-04 RX ADMIN — LISINOPRIL SCH MG: 5 TABLET ORAL at 09:25

## 2019-03-04 RX ADMIN — IPRATROPIUM BROMIDE AND ALBUTEROL SULFATE SCH ML: .5; 3 SOLUTION RESPIRATORY (INHALATION) at 20:05

## 2019-03-04 RX ADMIN — DEXTROSE SCH MLS/HR: 50 INJECTION, SOLUTION INTRAVENOUS at 14:28

## 2019-03-04 RX ADMIN — WATER SCH MG: 1 INJECTION INTRAMUSCULAR; INTRAVENOUS; SUBCUTANEOUS at 09:24

## 2019-03-04 RX ADMIN — IPRATROPIUM BROMIDE AND ALBUTEROL SULFATE SCH ML: .5; 3 SOLUTION RESPIRATORY (INHALATION) at 13:39

## 2019-03-04 RX ADMIN — DOCUSATE SODIUM SCH MG: 100 CAPSULE, LIQUID FILLED ORAL at 09:25

## 2019-03-04 RX ADMIN — ATORVASTATIN CALCIUM SCH MG: 20 TABLET, FILM COATED ORAL at 09:25

## 2019-03-04 RX ADMIN — PANTOPRAZOLE SODIUM SCH MG: 40 INJECTION, POWDER, FOR SOLUTION INTRAVENOUS at 09:24

## 2019-03-04 RX ADMIN — Medication SCH DEV: at 16:51

## 2019-03-04 RX ADMIN — Medication SCH MG: at 09:25

## 2019-03-04 RX ADMIN — Medication SCH DEV: at 06:37

## 2019-03-04 RX ADMIN — IPRATROPIUM BROMIDE AND ALBUTEROL SULFATE SCH ML: .5; 3 SOLUTION RESPIRATORY (INHALATION) at 07:00

## 2019-03-04 RX ADMIN — FUROSEMIDE SCH MG: 10 INJECTION, SOLUTION INTRAMUSCULAR; INTRAVENOUS at 09:24

## 2019-03-04 RX ADMIN — Medication SCH DEV: at 11:56

## 2019-03-04 RX ADMIN — CARVEDILOL SCH MG: 12.5 TABLET, FILM COATED ORAL at 09:25

## 2019-03-04 RX ADMIN — Medication SCH EACH: at 09:25

## 2019-03-04 NOTE — NUR
PT AWAKE A/O ABLE TO COMMUNICATE NEED. DENIES PAIN, DENIES SOB, O2 VIA NC IN PLACE. NO S/S 
OF ACUTE DISTRESS NOTED AT THIS TIME. CALL LIGHT AND PERSONAL ITEMS WITHIN REACH, SAFETY AND 
FALL PRECAUTIONS IN PLACE, DROPLET PRECAUTIONS MAINTAINED, WILL CONTINUE TO MONITOR.

## 2019-03-04 NOTE — NUR
FOLLOWED W MARY ABARCA REGARDING PT CO2 LEVELS, PT CURRENTLY WEARING 02 VIA NC BUT PER RT PT 
INCONSISTENT WITH USE AND REFUSES BIPAP AT NIGHT. DR ABARCA SPOKE WITH PT REGARDING O2 AND 
BIPAP USE AND NECESSITY, PT VERBALIZED UNDERSTANDING AND IS AGREEABLE. PT CURRENTLY RESTING 
IN BED, CALL LIGHT AND PERSONAL ITEMS WITHIN REACH, SAFETY AND FALL PRECAUTIONS IN PLACE, 
DROPLET PRECAUTIONS MAINTAINED, WILL CONTINUE TO MONITOR.

## 2019-03-04 NOTE — NUR
FOLLOWED W MARY ABARCA REGARDING PT CO2 LEVELS, PER MD PT CURRENTLY HAS ORDERS FOR RT, O2 
SUPPLEMENTS AND BIPAP. PER MD ENSURE PT MAINTAINS O2 AS ORDERED.

## 2019-03-04 NOTE — NUR
PLACED BIPAP FOR ONE HOUR PER DR ABARCA PT IS ALERT IN HF BS CLEAR GEL UNDER MED SIZE MASK 
,CONT. POX IN PLACE ALARMS ARE ONAD AUDIBLE PT ON DOCUMENTED SETTINGS

## 2019-03-04 NOTE — NUR
REPORT RECEIVED FROM NIGHT NURSE, PT ASLEEP. EASILY AROUSABLE TO  A/O ABLE TO COMMUNICATE 
NEED. DENIES PAIN, DENIES SOB, NO S/S OF ACUTE DISTRESS NOTED AT THIS TIME. CALL LIGHT AND 
PERSONAL ITEMS WITHIN REACH, SAFETY AND FALL PRECAUTIONS IN PLACE, DROPLET PRECAUTIONS 
MAINTAINED, WILL CONTINUE TO MONITOR.

## 2019-03-04 NOTE — NUR
PT ASLEEP EASILY AROUSABLE TO  A/O AND ABLE TO COMMUNICATE NEED. DENIES PAIN, DENIES SOB, O2 
VIA NC IN PLACE. NO S/S OF ACUTE DISTRESS NOTED AT THIS TIME. CALL LIGHT AND PERSONAL ITEMS 
WITHIN REACH, SAFETY AND FALL PRECAUTIONS IN PLACE, DROPLET PRECAUTIONS MAINTAINED, WILL 
CONTINUE TO MONITOR.

## 2019-03-04 NOTE — NUR
WILL ENDORSE TO NEXT SHIFT FOR CONTINUITY OF CARE. PT KEEPS TAKING OFF O2 NASAL CANNULA. 
NURSE PUT IN BACK IN .

## 2019-03-04 NOTE — NUR
PATIENT SPOKE TO DR. NAVA TO HOME AGAINST MEDICAL ADVICE, EXPLAINED ABOUT THE RISK OF 
LEAVING THE HOSPITAL AND  PAPERWORK SIGNED BY PATIENT WITNESS BY DR. NAVA.

## 2019-03-04 NOTE — NUR
PT REMAINED A/O THROUGHOUT SHIFT. PT DENIES PAIN, DENIES SOB, O2 ADMINISTERED PER ORDER. NO 
S/S OF ACUTE DISTRESS NOTED AT THIS TIME. CALL LIGHT AND PERSONAL ITEMS WITHIN REACH, SAFETY 
AND FALL PRECAUTIONS IN PLACE. REPORT ENDORSED TO NIGHT NURSE NURSE.

## 2019-06-12 ENCOUNTER — HOSPITAL ENCOUNTER (EMERGENCY)
Dept: HOSPITAL 26 - MED | Age: 54
Discharge: HOME | End: 2019-06-12
Payer: SELF-PAY

## 2019-06-12 VITALS — BODY MASS INDEX: 23.16 KG/M2 | HEIGHT: 60 IN | WEIGHT: 118 LBS

## 2019-06-12 VITALS — DIASTOLIC BLOOD PRESSURE: 113 MMHG | SYSTOLIC BLOOD PRESSURE: 156 MMHG

## 2019-06-12 VITALS — SYSTOLIC BLOOD PRESSURE: 138 MMHG | DIASTOLIC BLOOD PRESSURE: 97 MMHG

## 2019-06-12 DIAGNOSIS — I50.9: ICD-10-CM

## 2019-06-12 DIAGNOSIS — Z79.82: ICD-10-CM

## 2019-06-12 DIAGNOSIS — Z79.899: ICD-10-CM

## 2019-06-12 DIAGNOSIS — I11.0: ICD-10-CM

## 2019-06-12 DIAGNOSIS — J44.9: ICD-10-CM

## 2019-06-12 DIAGNOSIS — F15.10: ICD-10-CM

## 2019-06-12 DIAGNOSIS — F41.9: ICD-10-CM

## 2019-06-12 DIAGNOSIS — R04.0: Primary | ICD-10-CM

## 2019-06-12 PROCEDURE — 96374 THER/PROPH/DIAG INJ IV PUSH: CPT

## 2019-06-12 PROCEDURE — 99283 EMERGENCY DEPT VISIT LOW MDM: CPT

## 2019-06-12 NOTE — NUR
53 Y FEMALE BIB FAMILY C/O NOSEBLEED X2 DAYS, UNCONTROLLED, MINIMAL BLEEDING. 
PT DENIES TRAUMA/INJURY. PT /113. TACHYCARDIA . PT STATES SHE HAS 
BEEN OUT OF ALL OF HER MEDICATIONS FOR THE PAST 2 WEEKS. PT ALERT AND ORIENTED 
X4. Thai SPEAKING, FAMILY TRANSLATED. BED IS DOWN, LOCKED, BED RAIL X 1, 
ERMD TO SEE PT.



HX: HTN, COPD, CHF, ASTHMA

RX: SEE MED REC

## 2019-06-12 NOTE — NUR
Patient discharged with v/s stable. Written and verbal after care instructions 
given and explained. 

Patient alert, oriented and verbalized understanding of instructions. 
Ambulatory with steady gait. All questions addressed prior to discharge. ID 
band removed. Patient advised to follow up with PMD. Rx of LISINOPRIL, ASPIRIN, 
FUROSEMIDE, CARVEDILOL, VENTOLIN, ATORVASTATIN given. Patient educated on 
indication of medication including possible reaction and side effects. 
Opportunity to ask questions provided and answered. DAUGHTER TRANSLATED. 
INSTRUCTED TO LEAVE ROCKET IN FOR 24 TO 48 HOURS

## 2019-06-12 NOTE — NUR
PATIENT CRYING AND YELLING. STATES SHE IS HAVING AN ANXIETY ATTACK. JAVIER LORENZO 
SPEAKING WITH DAUGHTER AND PATIENT AT THIS TIME.

## 2019-06-12 NOTE — NUR
PT TAUGHT PROPER TECHNIQUE TO STOP NOSE BLEED. PT INSTRUCTED TO PINCH THE NOSE 
AND LEAN FORWARD AND CONTINUE TO HOLD PRESSURE.

## 2019-06-20 NOTE — NUR
REPORT RECEIVED FROM ED NURSE AT BEDSIDE. PT IN STABLE CONDITION. AAOX4. INTRODUCED SELF TO 
PT. BOARD UPDATED. NO COMPLAINTS OF PAIN. NO SOB ON 4L O2 VIA NC. AFEBRILE. PT IS SLIGHTLY 
HYPOTENSIVE. IV SITE R AC 20G RUNNING AZITHROMYCIN@250ML/HR AND BOLUS OF NS PATENT AND 
INTACT. SKIN WARM, DRY, AND INTACT WITH NO OPEN WOUNDS. BED LOCKED IN LOW POSITION. CALL 
BELL WITHIN REACH. SAFETY PRECAUTION IN PLACE. ALL NEEDS MET AT THIS TIME.

## 2019-06-22 ENCOUNTER — HOSPITAL ENCOUNTER (INPATIENT)
Dept: HOSPITAL 26 - MED | Age: 54
LOS: 3 days | Discharge: HOME | DRG: 720 | End: 2019-06-25
Attending: GENERAL PRACTICE | Admitting: GENERAL PRACTICE
Payer: MEDICAID

## 2019-06-22 VITALS — BODY MASS INDEX: 23.03 KG/M2 | HEIGHT: 61 IN | WEIGHT: 122 LBS

## 2019-06-22 VITALS — SYSTOLIC BLOOD PRESSURE: 86 MMHG | DIASTOLIC BLOOD PRESSURE: 54 MMHG

## 2019-06-22 VITALS — SYSTOLIC BLOOD PRESSURE: 94 MMHG | DIASTOLIC BLOOD PRESSURE: 49 MMHG

## 2019-06-22 DIAGNOSIS — I50.43: ICD-10-CM

## 2019-06-22 DIAGNOSIS — I42.7: ICD-10-CM

## 2019-06-22 DIAGNOSIS — Z79.899: ICD-10-CM

## 2019-06-22 DIAGNOSIS — J18.9: ICD-10-CM

## 2019-06-22 DIAGNOSIS — Z91.19: ICD-10-CM

## 2019-06-22 DIAGNOSIS — Z79.82: ICD-10-CM

## 2019-06-22 DIAGNOSIS — E87.6: ICD-10-CM

## 2019-06-22 DIAGNOSIS — E43: ICD-10-CM

## 2019-06-22 DIAGNOSIS — J96.02: ICD-10-CM

## 2019-06-22 DIAGNOSIS — I25.10: ICD-10-CM

## 2019-06-22 DIAGNOSIS — J44.1: ICD-10-CM

## 2019-06-22 DIAGNOSIS — D64.9: ICD-10-CM

## 2019-06-22 DIAGNOSIS — M94.0: ICD-10-CM

## 2019-06-22 DIAGNOSIS — A41.9: Primary | ICD-10-CM

## 2019-06-22 DIAGNOSIS — J44.0: ICD-10-CM

## 2019-06-22 DIAGNOSIS — I25.2: ICD-10-CM

## 2019-06-22 DIAGNOSIS — E11.65: ICD-10-CM

## 2019-06-22 DIAGNOSIS — I11.0: ICD-10-CM

## 2019-06-22 LAB
ALBUMIN FLD-MCNC: 2.7 G/DL (ref 3.4–5)
AMYLASE SERPL-CCNC: 35 U/L (ref 25–115)
ANION GAP SERPL CALCULATED.3IONS-SCNC: 11.8 MMOL/L (ref 8–16)
AST SERPL-CCNC: 14 U/L (ref 15–37)
BASOPHILS # BLD AUTO: 0 K/UL (ref 0–0.22)
BASOPHILS NFR BLD AUTO: 0.3 % (ref 0–2)
BILIRUB SERPL-MCNC: 0.6 MG/DL (ref 0–1)
BUN SERPL-MCNC: 20 MG/DL (ref 7–18)
CHLORIDE SERPL-SCNC: 97 MMOL/L (ref 98–107)
CO2 SERPL-SCNC: 30.8 MMOL/L (ref 21–32)
CREAT SERPL-MCNC: 1.2 MG/DL (ref 0.6–1.3)
EOSINOPHIL # BLD AUTO: 0.3 K/UL (ref 0–0.4)
EOSINOPHIL NFR BLD AUTO: 1.7 % (ref 0–4)
ERYTHROCYTE [DISTWIDTH] IN BLOOD BY AUTOMATED COUNT: 13.1 % (ref 11.6–13.7)
GFR SERPL CREATININE-BSD FRML MDRD: 61 ML/MIN (ref 90–?)
GLUCOSE SERPL-MCNC: 122 MG/DL (ref 74–106)
HCT VFR BLD AUTO: 32.5 % (ref 36–48)
HGB BLD-MCNC: 10.6 G/DL (ref 12–16)
IRON SERPL-MCNC: 12 UG/DL (ref 35–150)
LIPASE SERPL-CCNC: 152 U/L (ref 73–393)
LYMPHOCYTES # BLD AUTO: 0.6 K/UL (ref 2.5–16.5)
LYMPHOCYTES NFR BLD AUTO: 4.1 % (ref 20.5–51.1)
MAGNESIUM SERPL-MCNC: 1.8 MG/DL (ref 1.8–2.4)
MCH RBC QN AUTO: 28 PG (ref 27–31)
MCHC RBC AUTO-ENTMCNC: 33 G/DL (ref 33–37)
MCV RBC AUTO: 86.4 FL (ref 80–94)
MONOCYTES # BLD AUTO: 0.5 K/UL (ref 0.8–1)
MONOCYTES NFR BLD AUTO: 3.2 % (ref 1.7–9.3)
NEUTROPHILS # BLD AUTO: 13.9 K/UL (ref 1.8–7.7)
NEUTROPHILS NFR BLD AUTO: 90.7 % (ref 42.2–75.2)
PHOSPHATE SERPL-MCNC: 3.4 MG/DL (ref 2.5–4.9)
PLATELET # BLD AUTO: 357 K/UL (ref 140–450)
POTASSIUM SERPL-SCNC: 3.6 MMOL/L (ref 3.5–5.1)
PROTHROMBIN TIME: 10.8 SECS (ref 10.8–13.4)
RBC # BLD AUTO: 3.76 MIL/UL (ref 4.2–5.4)
SODIUM SERPL-SCNC: 136 MMOL/L (ref 136–145)
T4 FREE SERPL-MCNC: 1.22 NG/DL (ref 0.76–1.46)
TIBC SERPL-MCNC: 277 UG/DL (ref 250–450)
TSH SERPL DL<=0.05 MIU/L-ACNC: 1.27 UIU/ML (ref 0.34–3.74)
WBC # BLD AUTO: 15.3 K/UL (ref 4.8–10.8)

## 2019-06-22 RX ADMIN — DOCUSATE SODIUM SCH MG: 100 CAPSULE, LIQUID FILLED ORAL at 22:13

## 2019-06-22 RX ADMIN — SODIUM CHLORIDE SCH MLS/HR: 9 INJECTION, SOLUTION INTRAVENOUS at 21:58

## 2019-06-22 NOTE — NUR
PT LOOKED PALE AND LABORED BREATHING, O2 NC 2/MIN LAURE TO PT IMMEDIATLEY 
.ASSISTED PT TO ROOM AND DID BEDSIDE TRIAGE ASSESSMENT.

## 2019-06-22 NOTE — NUR
ADMINISTERED MEDS TO PT AS ORDERED. PT PAIN 8/10 AT THIS TIME. WILL REASSESS 
PAIN IN 30 MINUTES. WILL CONTINUE TO MONITOR PT.

## 2019-06-22 NOTE — NUR
Patient will be admitted to care of Dr. Casillas. Admited to tele.  Will go to 
room 120b. Belongings list completed.  Report to BJ Juárez. PT VSS

## 2019-06-22 NOTE — NUR
PT BIB SELF, AAO X4 C/O SOB X 3 DAYS, CHESTWALL  PAIN WHILE COUGHING. LABORED 
BREATHING NOTED, O2 SATS 94 % ON RA.O2 SATS INCREASED TO 98% AFTER O2 NC 
2L/MIN. PT USING MILD ACCESSORY MUSCLE WHILE BREATHING. PT HAS DRY COUGH,  
WHEEZING TO LETICIA  LUNGS DURING EXPIRATORY UPON AUSCULTATION. DR. BROWN 
NOTIFIED. RT AT THE BEDSIDE, PT ON BREATHING TREATMENT. EKG DONE AT THE 
BEDSIDE. PT PLACED ON FULL CARDIAC MONITOR. ER MD TO SEE THE PT.

## 2019-06-22 NOTE — NUR
MILD WHEEZING NOTED TO LETICIA UPPER LUNGS DURING EXPIRATION UPON AUSCULTATION. PT 
ON O2 2LPM VIA NC, O2 SAT 98%, NO S/S OF RESPIRATORY DISTRESS NOTED. PT ABLE TO 
SPEAK WITH FULL CLEAR SPEECH.

## 2019-06-22 NOTE — NUR
PT STATES NOT STABLE ENOUGH TO GO HOME AT THIS TIME. DR. BROWN NOTIFIED. DR Neli BROWN TO ORDER BREATHING TREATMENT FOR PT BEFORE DISCHARGE.

## 2019-06-22 NOTE — NUR
PT  STILL NOT FEELING WELL, BP HAS DECREASED /53, WHEEZING BILATERAL, 
DEMINISHED  LUNG SOUNDS THROUGHOUT. ER MD IS REEVALUATING PT. MONITORING PT.

## 2019-06-23 VITALS — SYSTOLIC BLOOD PRESSURE: 106 MMHG | DIASTOLIC BLOOD PRESSURE: 65 MMHG

## 2019-06-23 VITALS — DIASTOLIC BLOOD PRESSURE: 64 MMHG | SYSTOLIC BLOOD PRESSURE: 97 MMHG

## 2019-06-23 VITALS — DIASTOLIC BLOOD PRESSURE: 62 MMHG | SYSTOLIC BLOOD PRESSURE: 109 MMHG

## 2019-06-23 VITALS — SYSTOLIC BLOOD PRESSURE: 126 MMHG | DIASTOLIC BLOOD PRESSURE: 73 MMHG

## 2019-06-23 VITALS — DIASTOLIC BLOOD PRESSURE: 66 MMHG | SYSTOLIC BLOOD PRESSURE: 102 MMHG

## 2019-06-23 VITALS — SYSTOLIC BLOOD PRESSURE: 113 MMHG | DIASTOLIC BLOOD PRESSURE: 64 MMHG

## 2019-06-23 LAB
ANION GAP SERPL CALCULATED.3IONS-SCNC: 10.2 MMOL/L (ref 8–16)
APPEARANCE UR: CLEAR
BARBITURATES UR QL SCN: (no result) NG/ML
BASOPHILS # BLD AUTO: 0 K/UL (ref 0–0.22)
BASOPHILS NFR BLD AUTO: 0.1 % (ref 0–2)
BENZODIAZ UR QL SCN: (no result) NG/ML
BILIRUB UR QL STRIP: NEGATIVE
BUN SERPL-MCNC: 23 MG/DL (ref 7–18)
BZE UR QL SCN: (no result) NG/ML
CANNABINOIDS UR QL SCN: (no result) NG/ML
CHLORIDE SERPL-SCNC: 100 MMOL/L (ref 98–107)
CHOLEST/HDLC SERPL: 2.7 {RATIO} (ref 1–4.5)
CO2 SERPL-SCNC: 30.1 MMOL/L (ref 21–32)
COLOR UR: YELLOW
CREAT SERPL-MCNC: 1 MG/DL (ref 0.6–1.3)
EOSINOPHIL # BLD AUTO: 0 K/UL (ref 0–0.4)
EOSINOPHIL NFR BLD AUTO: 0 % (ref 0–4)
ERYTHROCYTE [DISTWIDTH] IN BLOOD BY AUTOMATED COUNT: 13 % (ref 11.6–13.7)
GFR SERPL CREATININE-BSD FRML MDRD: 75 ML/MIN (ref 90–?)
GLUCOSE SERPL-MCNC: 176 MG/DL (ref 74–106)
GLUCOSE UR STRIP-MCNC: (no result) MG/DL
HCT VFR BLD AUTO: 31.3 % (ref 36–48)
HDLC SERPL-MCNC: 40 MG/DL (ref 40–60)
HGB BLD-MCNC: 10.3 G/DL (ref 12–16)
HGB UR QL STRIP: NEGATIVE
LDLC SERPL CALC-MCNC: 58 MG/DL (ref 60–100)
LEUKOCYTE ESTERASE UR QL STRIP: NEGATIVE
LYMPHOCYTES # BLD AUTO: 0.4 K/UL (ref 2.5–16.5)
LYMPHOCYTES NFR BLD AUTO: 3.6 % (ref 20.5–51.1)
MAGNESIUM SERPL-MCNC: 2.3 MG/DL (ref 1.8–2.4)
MCH RBC QN AUTO: 29 PG (ref 27–31)
MCHC RBC AUTO-ENTMCNC: 33 G/DL (ref 33–37)
MCV RBC AUTO: 87 FL (ref 80–94)
MONOCYTES # BLD AUTO: 0.2 K/UL (ref 0.8–1)
MONOCYTES NFR BLD AUTO: 1.5 % (ref 1.7–9.3)
NEUTROPHILS # BLD AUTO: 9.6 K/UL (ref 1.8–7.7)
NEUTROPHILS NFR BLD AUTO: 94.8 % (ref 42.2–75.2)
NITRITE UR QL STRIP: NEGATIVE
OPIATES UR QL SCN: (no result) NG/ML
PCP UR QL SCN: (no result) NG/ML
PH UR STRIP: 6.5 [PH] (ref 5–9)
PHOSPHATE SERPL-MCNC: 2.6 MG/DL (ref 2.5–4.9)
PLATELET # BLD AUTO: 356 K/UL (ref 140–450)
POTASSIUM SERPL-SCNC: 3.3 MMOL/L (ref 3.5–5.1)
RBC # BLD AUTO: 3.6 MIL/UL (ref 4.2–5.4)
SODIUM SERPL-SCNC: 137 MMOL/L (ref 136–145)
TRIGL SERPL-MCNC: 45 MG/DL (ref 30–150)
WBC # BLD AUTO: 10.2 K/UL (ref 4.8–10.8)

## 2019-06-23 RX ADMIN — SODIUM CHLORIDE SCH MLS/HR: 9 INJECTION, SOLUTION INTRAVENOUS at 21:16

## 2019-06-23 RX ADMIN — IPRATROPIUM BROMIDE AND ALBUTEROL SULFATE SCH ML: .5; 3 SOLUTION RESPIRATORY (INHALATION) at 06:34

## 2019-06-23 RX ADMIN — Medication SCH DEV: at 21:12

## 2019-06-23 RX ADMIN — BUDESONIDE SCH MG: 0.5 SUSPENSION RESPIRATORY (INHALATION) at 06:43

## 2019-06-23 RX ADMIN — Medication SCH MG: at 08:51

## 2019-06-23 RX ADMIN — Medication SCH MG: at 21:06

## 2019-06-23 RX ADMIN — CARVEDILOL SCH MG: 12.5 TABLET, FILM COATED ORAL at 21:07

## 2019-06-23 RX ADMIN — DOCUSATE SODIUM SCH MG: 100 CAPSULE, LIQUID FILLED ORAL at 08:51

## 2019-06-23 RX ADMIN — SODIUM CHLORIDE SCH MLS/HR: 9 INJECTION, SOLUTION INTRAVENOUS at 15:03

## 2019-06-23 RX ADMIN — Medication SCH DEV: at 16:32

## 2019-06-23 RX ADMIN — ATORVASTATIN CALCIUM SCH MG: 20 TABLET, FILM COATED ORAL at 08:51

## 2019-06-23 RX ADMIN — INSULIN LISPRO PRN UNITS: 100 INJECTION, SOLUTION INTRAVENOUS; SUBCUTANEOUS at 17:30

## 2019-06-23 RX ADMIN — DOCUSATE SODIUM SCH MG: 100 CAPSULE, LIQUID FILLED ORAL at 21:07

## 2019-06-23 RX ADMIN — CARVEDILOL SCH MG: 12.5 TABLET, FILM COATED ORAL at 08:51

## 2019-06-23 RX ADMIN — Medication SCH DEV: at 11:36

## 2019-06-23 RX ADMIN — IPRATROPIUM BROMIDE AND ALBUTEROL SULFATE SCH ML: .5; 3 SOLUTION RESPIRATORY (INHALATION) at 19:13

## 2019-06-23 RX ADMIN — INSULIN LISPRO PRN UNITS: 100 INJECTION, SOLUTION INTRAVENOUS; SUBCUTANEOUS at 21:18

## 2019-06-23 RX ADMIN — Medication SCH DEV: at 05:38

## 2019-06-23 RX ADMIN — IPRATROPIUM BROMIDE AND ALBUTEROL SULFATE SCH ML: .5; 3 SOLUTION RESPIRATORY (INHALATION) at 12:50

## 2019-06-23 RX ADMIN — INSULIN LISPRO PRN UNITS: 100 INJECTION, SOLUTION INTRAVENOUS; SUBCUTANEOUS at 11:53

## 2019-06-23 RX ADMIN — INSULIN LISPRO PRN UNITS: 100 INJECTION, SOLUTION INTRAVENOUS; SUBCUTANEOUS at 05:46

## 2019-06-23 RX ADMIN — LISINOPRIL SCH MG: 5 TABLET ORAL at 08:52

## 2019-06-23 RX ADMIN — DEXTROSE SCH MLS/HR: 50 INJECTION, SOLUTION INTRAVENOUS at 21:06

## 2019-06-23 RX ADMIN — BUDESONIDE SCH MG: 0.5 SUSPENSION RESPIRATORY (INHALATION) at 19:13

## 2019-06-23 NOTE — NUR
PATIENT WAS AWAKE, ALERT. IV PATENT AND INTACT. PATIENT WAS REMINDED THAT URINE SAMPLE WAS 
STILL NEEDED TO BE COLLECTED. PATIENT VERBALIZED UNDERSTANDING. NO DISTRESS NOTED AT THIS 
TIME

## 2019-06-23 NOTE — NUR
PATIENT WAS AWAKE, ALERT. RESPIRATION EVEN, UNLABOR ON 2L NC. DENIED PAIN, SOB AT THIS TIME. 
NO DISTRESS NOTED. CALL LIGHT WITHIN REACH

## 2019-06-23 NOTE — NUR
PATIENT WAS PLACED ON ROOM AIR PER ORDER. SPO2 92%. PATIENT WAS PLACED BACK ON 2L NC. WILL 
NOTIFY MD

## 2019-06-23 NOTE — NUR
PATIENT WAS EXPLAINED THAT SPUTUM SAMPLE NEEDED TO BE COLLECTED PER ORDER. PATIENT 
VERBALIZED UNDERSTANDING. SPECIMEN CUP WAS PLACED AT BEDSIDE

## 2019-06-23 NOTE — NUR
CHECKED PATIENT. PATIENT SLEEPING RESPIRATION EVEN UNLABORED ON 2L NC. NO DISTRESS NOTED. 
WILL CONTINUE TO MONITOR.

## 2019-06-23 NOTE — NUR
PATIENT WAS SLEEPING COMFORTABLY. RESPIRATION EVEN, UNLABOR ON 2L NC. DENIED PAIN, SOB AT 
THIS TIME. NO DISTRESS NOTED

## 2019-06-23 NOTE — NUR
PATIENT WAS RESTING COMFORTABLY. PATIENT WAS FOUND OFF OXYGEN. PATIENT WAS EXPLAINED SHE 
NEEDS TO BE ON OXYGEN AT ALL TIME. PATIENT VERBALIZED UNDERSTANDING. MED WAS GIVEN PER ORDER

## 2019-06-23 NOTE — NUR
PATIENT WAS AWAKE, ALERT. RESPIRATION EVEN, UNLABOR ON 2L NC. SKIN DRY AND WARM. IV PATENT 
AND INTACT. DENIED PAIN, SOB. PLAN OF CARE WAS DISCUSSED WITH PATIENT. BED AT LOW POSITION, 
SIDE RAILS UP. CALL LIGHT WITHIN REACH

## 2019-06-23 NOTE — NUR
PATIENT WAS SLEEPING COMFORTABLY. RESPIRATION EVEN, UNLABOR ON 2L NC. NO DISTRESS NOTED AT 
THIS TIME

## 2019-06-23 NOTE — NUR
RECEIVED BEDSIDE REPORT FROM DAY SHIFT NURSE AGA. PATIENT IS SLEEPING BUT AROUSABLE TO 
NAME. RESPIRATION EVEN UNLABORED ON 2L O2 NC. NO DISTRESS NOTED. SKIN IS WARM AND DRY. IV 
PATENT AND INTACT. PATIENT IS AMBULATORY AND ABLE TO MAKE NEEDS KNOWN. PLAN OF CARE WAS 
DISCUSSED. ALL SAFETY MEASURES IN PLACE. BED IS AT LOW POSITION. CALL LIGHT WITHIN REACH AND 
VERBALIZES ITS USE. WILL CONTINUE TO MONITOR.

## 2019-06-23 NOTE — NUR
PATIENT STATED SHE THREW URINE SAMPLE AWAY. PATIENT WAS EXPLAINED URINE SAMPLE WAS NEEDED 
PER ORDER, AND CALL STAFF AS SOON AS SHE URINATES. PATIENT VERBALIZED UNDERSTANDING.

## 2019-06-24 VITALS — DIASTOLIC BLOOD PRESSURE: 79 MMHG | SYSTOLIC BLOOD PRESSURE: 133 MMHG

## 2019-06-24 VITALS — SYSTOLIC BLOOD PRESSURE: 123 MMHG | DIASTOLIC BLOOD PRESSURE: 72 MMHG

## 2019-06-24 VITALS — SYSTOLIC BLOOD PRESSURE: 135 MMHG | DIASTOLIC BLOOD PRESSURE: 85 MMHG

## 2019-06-24 VITALS — SYSTOLIC BLOOD PRESSURE: 136 MMHG | DIASTOLIC BLOOD PRESSURE: 84 MMHG

## 2019-06-24 VITALS — DIASTOLIC BLOOD PRESSURE: 73 MMHG | SYSTOLIC BLOOD PRESSURE: 124 MMHG

## 2019-06-24 VITALS — DIASTOLIC BLOOD PRESSURE: 79 MMHG | SYSTOLIC BLOOD PRESSURE: 125 MMHG

## 2019-06-24 LAB
ANION GAP SERPL CALCULATED.3IONS-SCNC: 10 MMOL/L (ref 8–16)
BUN SERPL-MCNC: 31 MG/DL (ref 7–18)
CHLORIDE SERPL-SCNC: 104 MMOL/L (ref 98–107)
CO2 SERPL-SCNC: 30.9 MMOL/L (ref 21–32)
CREAT SERPL-MCNC: 1.1 MG/DL (ref 0.6–1.3)
ERYTHROCYTE [DISTWIDTH] IN BLOOD BY AUTOMATED COUNT: 13.5 % (ref 11.6–13.7)
GFR SERPL CREATININE-BSD FRML MDRD: 67 ML/MIN (ref 90–?)
GLUCOSE SERPL-MCNC: 123 MG/DL (ref 74–106)
HCT VFR BLD AUTO: 32.3 % (ref 36–48)
HGB BLD-MCNC: 10.5 G/DL (ref 12–16)
LYMPHOCYTES NFR BLD MANUAL: 10 % (ref 20–46)
MCH RBC QN AUTO: 28 PG (ref 27–31)
MCHC RBC AUTO-ENTMCNC: 33 G/DL (ref 33–37)
MCV RBC AUTO: 86.9 FL (ref 80–94)
MONOCYTES NFR BLD MANUAL: 3 % (ref 5–12)
PLATELET # BLD AUTO: 416 K/UL (ref 140–450)
POTASSIUM SERPL-SCNC: 4.9 MMOL/L (ref 3.5–5.1)
RBC # BLD AUTO: 3.72 MIL/UL (ref 4.2–5.4)
SODIUM SERPL-SCNC: 140 MMOL/L (ref 136–145)
TRANSFERRIN SERPL-MCNC: 239 MG/DL (ref 200–370)
WBC # BLD AUTO: 26.5 K/UL (ref 4.8–10.8)

## 2019-06-24 RX ADMIN — BUDESONIDE SCH MG: 0.5 SUSPENSION RESPIRATORY (INHALATION) at 19:47

## 2019-06-24 RX ADMIN — LISINOPRIL SCH MG: 5 TABLET ORAL at 09:28

## 2019-06-24 RX ADMIN — Medication SCH DEV: at 16:30

## 2019-06-24 RX ADMIN — SODIUM CHLORIDE SCH MLS/HR: 9 INJECTION, SOLUTION INTRAVENOUS at 21:02

## 2019-06-24 RX ADMIN — Medication SCH MG: at 09:29

## 2019-06-24 RX ADMIN — Medication SCH DEV: at 06:49

## 2019-06-24 RX ADMIN — Medication SCH MG: at 20:24

## 2019-06-24 RX ADMIN — ATORVASTATIN CALCIUM SCH MG: 20 TABLET, FILM COATED ORAL at 09:29

## 2019-06-24 RX ADMIN — Medication SCH DEV: at 12:13

## 2019-06-24 RX ADMIN — CARVEDILOL SCH MG: 12.5 TABLET, FILM COATED ORAL at 20:24

## 2019-06-24 RX ADMIN — IPRATROPIUM BROMIDE AND ALBUTEROL SULFATE SCH ML: .5; 3 SOLUTION RESPIRATORY (INHALATION) at 19:47

## 2019-06-24 RX ADMIN — BUDESONIDE SCH MG: 0.5 SUSPENSION RESPIRATORY (INHALATION) at 06:33

## 2019-06-24 RX ADMIN — DOCUSATE SODIUM SCH MG: 100 CAPSULE, LIQUID FILLED ORAL at 20:24

## 2019-06-24 RX ADMIN — Medication SCH DEV: at 21:02

## 2019-06-24 RX ADMIN — DEXTROSE SCH MLS/HR: 50 INJECTION, SOLUTION INTRAVENOUS at 20:24

## 2019-06-24 RX ADMIN — IPRATROPIUM BROMIDE AND ALBUTEROL SULFATE SCH ML: .5; 3 SOLUTION RESPIRATORY (INHALATION) at 12:51

## 2019-06-24 RX ADMIN — CARVEDILOL SCH MG: 12.5 TABLET, FILM COATED ORAL at 09:29

## 2019-06-24 RX ADMIN — DOCUSATE SODIUM SCH MG: 100 CAPSULE, LIQUID FILLED ORAL at 09:29

## 2019-06-24 RX ADMIN — INSULIN LISPRO PRN UNITS: 100 INJECTION, SOLUTION INTRAVENOUS; SUBCUTANEOUS at 21:00

## 2019-06-24 NOTE — NUR
TALKED WITH DR JUÁREZ ABOUT PT'S WBC COUNT, HE SAID TO DC HER SOLUMEDROL AND START PT ON 40 
MG PREDNISONE PO DAILY. DR ALATORRE WAS MADE AWARE OF THIS ORDER.

## 2019-06-24 NOTE — NUR
RECEIVED BEDSIDE REPORT FROM NIGHT SHIFT NURSE. PT IS ASLEEP, NO S/S OF ANY ACUTE DISTRESS 
OR SOB. PT IS ON 2L O2 NC. SKIN IS INTACT. IV IN THE R AC, 20 G, INFUSING NS 30 ML/HR. PT IS 
AMBULATORY AND NOT A FALL RISK. CALL LIGHT IS WITHIN REACH WILL CONTINUE TO MONITOR.

## 2019-06-24 NOTE — NUR
PATIENT HAS BEEN SCREENED AND CATEGORIZED AS HIGH NUTRITION RISK. PATIENT WILL BE SEEN 
WITHIN 1-2 DAYS OF ADMISSION.



06/24/19



GREG CATALAN RD

## 2019-06-24 NOTE — NUR
ALL SCHEDULED MEDS WERE GIVEN. PATIENT COMPLAINED OF 6/10 HEADACHE. PRN PAIN MEDS 
ADMINISTERED WILL CONTINUE TO MONITOR.

## 2019-06-24 NOTE — NUR
RECEIVED BEDSIDE REPORT FROM DAY SHIFT NURSE. PATIENT IS AWAKE, ALERT, AND COOPERATIVE. 
RESPIRATION EVEN UNLABORED ON ROOM AIR. NO DISTRESS NOTED. SKIN IS WARM AND DRY. IV PATENT 
AND INTACT. PATIENT IS AMBULATORY AND ABLE TO MAKE NEEDS KNOWN. PLAN OF CARE WAS DISCUSSED. 
ALL SAFETY MEASURES IN PLACE. BED IS AT LOW POSITION. CALL LIGHT WITHIN REACH AND VERBALIZES 
ITS USE. WILL CONTINUE TO MONITOR.

## 2019-06-24 NOTE — NUR
INITIAL ASSESSMENT DONE. VITALS WERE TAKEN. PATIENT SATING 93-94% ON ROOM AIR WITH NO 
DISTRESS NOTED. WILL CONTINUE TO MONITOR.

## 2019-06-24 NOTE — NUR
VITALS WERE TAKEN. PATIENT WAS FOUND OFF OXYGEN AND SATING 95% WITH NO DISTRESS NOTED. WILL 
CONTINUE TO MONITOR.

## 2019-06-24 NOTE — NUR
RECHECKED PATIENT SPO2%. PATIENT SATING 93% IN ROOM AIR. NO DISTRESS NOTED. WILL CONTINUE TO 
MONITOR.

## 2019-06-24 NOTE — NUR
PATIENT WATCHING TV RESPIRATION EVEN UNLABORED ON ROOM AIR. NO DISTRESS NOTED. WILL CONTINUE 
TO MONITOR.

## 2019-06-25 VITALS — SYSTOLIC BLOOD PRESSURE: 147 MMHG | DIASTOLIC BLOOD PRESSURE: 90 MMHG

## 2019-06-25 VITALS — SYSTOLIC BLOOD PRESSURE: 141 MMHG | DIASTOLIC BLOOD PRESSURE: 90 MMHG

## 2019-06-25 VITALS — DIASTOLIC BLOOD PRESSURE: 82 MMHG | SYSTOLIC BLOOD PRESSURE: 130 MMHG

## 2019-06-25 LAB
ANION GAP SERPL CALCULATED.3IONS-SCNC: 8.1 MMOL/L (ref 8–16)
BASOPHILS # BLD AUTO: 0.1 K/UL (ref 0–0.22)
BASOPHILS NFR BLD AUTO: 0.4 % (ref 0–2)
BUN SERPL-MCNC: 34 MG/DL (ref 7–18)
CHLORIDE SERPL-SCNC: 104 MMOL/L (ref 98–107)
CO2 SERPL-SCNC: 33.3 MMOL/L (ref 21–32)
CREAT SERPL-MCNC: 1 MG/DL (ref 0.6–1.3)
EOSINOPHIL # BLD AUTO: 0.1 K/UL (ref 0–0.4)
EOSINOPHIL NFR BLD AUTO: 0.6 % (ref 0–4)
ERYTHROCYTE [DISTWIDTH] IN BLOOD BY AUTOMATED COUNT: 13.3 % (ref 11.6–13.7)
FERRITIN SERPL-MCNC: 137 NG/ML (ref 15–150)
FOLATE SERPL-MCNC: 10.8 NG/ML (ref 3–?)
GFR SERPL CREATININE-BSD FRML MDRD: 75 ML/MIN (ref 90–?)
GLUCOSE SERPL-MCNC: 93 MG/DL (ref 74–106)
HCT VFR BLD AUTO: 32.2 % (ref 36–48)
HGB BLD-MCNC: 10.5 G/DL (ref 12–16)
LYMPHOCYTES # BLD AUTO: 1.9 K/UL (ref 2.5–16.5)
LYMPHOCYTES NFR BLD AUTO: 13.1 % (ref 20.5–51.1)
MAGNESIUM SERPL-MCNC: 1.9 MG/DL (ref 1.8–2.4)
MCH RBC QN AUTO: 28 PG (ref 27–31)
MCHC RBC AUTO-ENTMCNC: 33 G/DL (ref 33–37)
MCV RBC AUTO: 86.8 FL (ref 80–94)
MONOCYTES # BLD AUTO: 1.1 K/UL (ref 0.8–1)
MONOCYTES NFR BLD AUTO: 7.6 % (ref 1.7–9.3)
NEUTROPHILS # BLD AUTO: 11.5 K/UL (ref 1.8–7.7)
NEUTROPHILS NFR BLD AUTO: 78.3 % (ref 42.2–75.2)
PHOSPHATE SERPL-MCNC: 3.4 MG/DL (ref 2.5–4.9)
PLATELET # BLD AUTO: 447 K/UL (ref 140–450)
POTASSIUM SERPL-SCNC: 4.4 MMOL/L (ref 3.5–5.1)
RBC # BLD AUTO: 3.71 MIL/UL (ref 4.2–5.4)
SODIUM SERPL-SCNC: 141 MMOL/L (ref 136–145)
VIT B12 SERPL-MCNC: 761 PG/ML (ref 232–1245)
WBC # BLD AUTO: 14.6 K/UL (ref 4.8–10.8)

## 2019-06-25 RX ADMIN — IPRATROPIUM BROMIDE AND ALBUTEROL SULFATE SCH ML: .5; 3 SOLUTION RESPIRATORY (INHALATION) at 07:01

## 2019-06-25 RX ADMIN — Medication SCH DEV: at 06:04

## 2019-06-25 RX ADMIN — BUDESONIDE SCH MG: 0.5 SUSPENSION RESPIRATORY (INHALATION) at 07:09

## 2019-06-25 NOTE — NUR
PT HAS DISCHARGED. PT WAS GIVEN DC INSTRUCTIONS AND EXPLANATION OF PRESCRIPTIONS. IV SITE 
WAS REMOVED. PT LEFT WITH ALL HER BELONGINGS IN STABLE CONDITION ACCOMPANIED BY HER 
DAUGHTER.

## 2019-06-25 NOTE — NUR
OBTAINED BEDSIDE REPORT FROM NIGHT SHIFT NURSE. PT IS ASLEEP, NO S/S OF ACUTE DISTRESS OR 
SOB NOTED. PT IS CURRENTLY ON ROOM AIR. SKIN IS INTACT. IV SITE ON THE R AC 20 G, INFUSING 
NS 30 ML/HR. PT IS AMBULATORY AND NOT A FALL RISK. CALL LIGHT IS WITHIN REACH, WILL CONTINUE 
TO MONITOR.
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
## 2020-12-06 ENCOUNTER — HOSPITAL ENCOUNTER (EMERGENCY)
Dept: HOSPITAL 26 - MED | Age: 55
LOS: 1 days | Discharge: HOME | End: 2020-12-07
Payer: MEDICAID

## 2020-12-06 VITALS — BODY MASS INDEX: 23.22 KG/M2 | WEIGHT: 123 LBS | HEIGHT: 61 IN

## 2020-12-06 VITALS — DIASTOLIC BLOOD PRESSURE: 78 MMHG | SYSTOLIC BLOOD PRESSURE: 134 MMHG

## 2020-12-06 DIAGNOSIS — Z79.899: ICD-10-CM

## 2020-12-06 DIAGNOSIS — I11.0: ICD-10-CM

## 2020-12-06 DIAGNOSIS — J44.9: ICD-10-CM

## 2020-12-06 DIAGNOSIS — I50.9: ICD-10-CM

## 2020-12-06 DIAGNOSIS — Z79.82: ICD-10-CM

## 2020-12-06 DIAGNOSIS — J45.901: Primary | ICD-10-CM

## 2020-12-06 PROCEDURE — 94640 AIRWAY INHALATION TREATMENT: CPT

## 2020-12-06 PROCEDURE — 71045 X-RAY EXAM CHEST 1 VIEW: CPT

## 2020-12-06 PROCEDURE — 96372 THER/PROPH/DIAG INJ SC/IM: CPT

## 2020-12-06 PROCEDURE — 99283 EMERGENCY DEPT VISIT LOW MDM: CPT

## 2020-12-07 VITALS — SYSTOLIC BLOOD PRESSURE: 138 MMHG | DIASTOLIC BLOOD PRESSURE: 83 MMHG

## 2020-12-07 NOTE — NUR
Patient discharged with v/s stable. Written and verbal after care instructions 
given and explained. 

Patient alert, oriented and verbalized understanding of instructions. 
Ambulatory with steady gait. All questions addressed prior to discharge. ID 
band removed. Patient advised to follow up with PMD. Rx of TESSALON PERLES, 
PREDNISONE, AZITHROMYCIN given. Patient educated on indication of medication 
including possible reaction and side effects. Opportunity to ask questions 
provided and answered.

## 2021-03-13 ENCOUNTER — HOSPITAL ENCOUNTER (INPATIENT)
Dept: HOSPITAL 26 - MED | Age: 56
LOS: 2 days | DRG: 720 | End: 2021-03-15
Attending: FAMILY MEDICINE | Admitting: FAMILY MEDICINE
Payer: MEDICAID

## 2021-03-13 VITALS — WEIGHT: 134 LBS | BODY MASS INDEX: 26.31 KG/M2 | HEIGHT: 60 IN

## 2021-03-13 VITALS — DIASTOLIC BLOOD PRESSURE: 125 MMHG | SYSTOLIC BLOOD PRESSURE: 160 MMHG

## 2021-03-13 VITALS — SYSTOLIC BLOOD PRESSURE: 161 MMHG | DIASTOLIC BLOOD PRESSURE: 104 MMHG

## 2021-03-13 VITALS — DIASTOLIC BLOOD PRESSURE: 95 MMHG | SYSTOLIC BLOOD PRESSURE: 122 MMHG

## 2021-03-13 DIAGNOSIS — R31.9: ICD-10-CM

## 2021-03-13 DIAGNOSIS — I50.43: ICD-10-CM

## 2021-03-13 DIAGNOSIS — J69.0: ICD-10-CM

## 2021-03-13 DIAGNOSIS — J44.9: ICD-10-CM

## 2021-03-13 DIAGNOSIS — R65.20: ICD-10-CM

## 2021-03-13 DIAGNOSIS — I48.0: ICD-10-CM
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
## 2021-03-13 DIAGNOSIS — I25.10: ICD-10-CM

## 2021-03-13 DIAGNOSIS — I46.9: ICD-10-CM

## 2021-03-13 DIAGNOSIS — D64.9: ICD-10-CM

## 2021-03-13 DIAGNOSIS — I11.0: ICD-10-CM

## 2021-03-13 DIAGNOSIS — I42.7: ICD-10-CM

## 2021-03-13 DIAGNOSIS — R80.9: ICD-10-CM

## 2021-03-13 DIAGNOSIS — Z20.822: ICD-10-CM

## 2021-03-13 DIAGNOSIS — T43.625A: ICD-10-CM

## 2021-03-13 DIAGNOSIS — I34.0: ICD-10-CM

## 2021-03-13 DIAGNOSIS — A41.9: Primary | ICD-10-CM

## 2021-03-13 DIAGNOSIS — E44.1: ICD-10-CM

## 2021-03-13 DIAGNOSIS — I42.9: ICD-10-CM

## 2021-03-13 DIAGNOSIS — Z91.19: ICD-10-CM

## 2021-03-13 DIAGNOSIS — J96.01: ICD-10-CM

## 2021-03-13 DIAGNOSIS — Z90.721: ICD-10-CM

## 2021-03-13 DIAGNOSIS — I26.99: ICD-10-CM

## 2021-03-13 DIAGNOSIS — E11.9: ICD-10-CM

## 2021-03-13 LAB
ALBUMIN FLD-MCNC: 3.3 G/DL (ref 3.4–5)
AMYLASE SERPL-CCNC: 44 U/L (ref 25–115)
ANION GAP SERPL CALCULATED.3IONS-SCNC: 10.6 MMOL/L (ref 8–16)
APPEARANCE UR: CLEAR
AST SERPL-CCNC: 33 U/L (ref 15–37)
BARBITURATES UR QL SCN: NEGATIVE NG/ML
BASOPHILS # BLD AUTO: 0.1 K/UL (ref 0–0.22)
BASOPHILS NFR BLD AUTO: 1.3 % (ref 0–2)
BENZODIAZ UR QL SCN: NEGATIVE NG/ML
BILIRUB SERPL-MCNC: 1.4 MG/DL (ref 0–1)
BILIRUB UR QL STRIP: NEGATIVE
BUN SERPL-MCNC: 37 MG/DL (ref 7–18)
BZE UR QL SCN: NEGATIVE NG/ML
C-REACTIVE PROTEIN QUANT: 1.1 MG/DL (ref 0–0.9)
CANNABINOIDS UR QL SCN: NEGATIVE NG/ML
CHLORIDE SERPL-SCNC: 100 MMOL/L (ref 98–107)
CHOLEST/HDLC SERPL: 2.4 {RATIO} (ref 1–4.5)
CO2 SERPL-SCNC: 29.4 MMOL/L (ref 21–32)
COLOR UR: YELLOW
CREAT SERPL-MCNC: 1.2 MG/DL (ref 0.6–1.3)
DEPRECATED D DIMER PPP-ACNC: 169 NG/ML (ref 0–400)
EOSINOPHIL # BLD AUTO: 0.2 K/UL (ref 0–0.4)
EOSINOPHIL NFR BLD AUTO: 3.7 % (ref 0–4)
ERYTHROCYTE [DISTWIDTH] IN BLOOD BY AUTOMATED COUNT: 18 % (ref 11.6–13.7)
FIBRINOGEN PPP-MCNC: 339 MG/DL (ref 200–400)
GFR SERPL CREATININE-BSD FRML MDRD: 60 ML/MIN (ref 90–?)
GLUCOSE SERPL-MCNC: 92 MG/DL (ref 74–106)
GLUCOSE UR STRIP-MCNC: NEGATIVE MG/DL
HCT VFR BLD AUTO: 35.2 % (ref 36–48)
HDLC SERPL-MCNC: 54 MG/DL (ref 40–60)
HGB BLD-MCNC: 10.9 G/DL (ref 12–16)
HGB UR QL STRIP: (no result)
LDH SERPL-CCNC: 279 U/L (ref 81–234)
LDLC SERPL CALC-MCNC: 66 MG/DL (ref 60–100)
LEUKOCYTE ESTERASE UR QL STRIP: NEGATIVE
LIPASE SERPL-CCNC: 250 U/L (ref 73–393)
LYMPHOCYTES # BLD AUTO: 1.7 K/UL (ref 2.5–16.5)
LYMPHOCYTES NFR BLD AUTO: 25.4 % (ref 20.5–51.1)
MAGNESIUM SERPL-MCNC: 2.3 MG/DL (ref 1.8–2.4)
MCH RBC QN AUTO: 25 PG (ref 27–31)
MCHC RBC AUTO-ENTMCNC: 31 G/DL (ref 33–37)
MCV RBC AUTO: 79.9 FL (ref 80–94)
MONOCYTES # BLD AUTO: 0.6 K/UL (ref 0.8–1)
MONOCYTES NFR BLD AUTO: 9.8 % (ref 1.7–9.3)
NEUTROPHILS # BLD AUTO: 3.9 K/UL (ref 1.8–7.7)
NEUTROPHILS NFR BLD AUTO: 59.8 % (ref 42.2–75.2)
NITRITE UR QL STRIP: NEGATIVE
OPIATES UR QL SCN: NEGATIVE NG/ML
PCP UR QL SCN: NEGATIVE NG/ML
PH UR STRIP: 6 [PH] (ref 5–9)
PHOSPHATE SERPL-MCNC: 4.6 MG/DL (ref 2.5–4.9)
PLATELET # BLD AUTO: 360 K/UL (ref 140–450)
POTASSIUM SERPL-SCNC: 5 MMOL/L (ref 3.5–5.1)
PROTHROMBIN TIME: 12.6 SECS (ref 10.8–13.4)
RBC # BLD AUTO: 4.4 MIL/UL (ref 4.2–5.4)
RBC #/AREA URNS HPF: (no result) /HPF (ref 0–5)
RSV AG SPEC QL IA: NEGATIVE
SODIUM SERPL-SCNC: 135 MMOL/L (ref 136–145)
T4 FREE SERPL-MCNC: 1.09 NG/DL (ref 0.76–1.46)
TRIGL SERPL-MCNC: 56 MG/DL (ref 30–150)
TSH SERPL DL<=0.05 MIU/L-ACNC: 1.88 UIU/ML (ref 0.34–3.74)
WBC # BLD AUTO: 6.5 K/UL (ref 4.8–10.8)
WBC,URINE: (no result) /HPF (ref 0–5)

## 2021-03-13 PROCEDURE — 5A12012 PERFORMANCE OF CARDIAC OUTPUT, SINGLE, MANUAL: ICD-10-PCS | Performed by: FAMILY MEDICINE

## 2021-03-13 PROCEDURE — U0003 INFECTIOUS AGENT DETECTION BY NUCLEIC ACID (DNA OR RNA); SEVERE ACUTE RESPIRATORY SYNDROME CORONAVIRUS 2 (SARS-COV-2) (CORONAVIRUS DISEASE [COVID-19]), AMPLIFIED PROBE TECHNIQUE, MAKING USE OF HIGH THROUGHPUT TECHNOLOGIES AS DESCRIBED BY CMS-2020-01-R: HCPCS

## 2021-03-13 RX ADMIN — ENOXAPARIN SODIUM SCH MG: 60 INJECTION SUBCUTANEOUS at 22:39

## 2021-03-13 RX ADMIN — FUROSEMIDE SCH MG: 10 INJECTION, SOLUTION INTRAMUSCULAR; INTRAVENOUS at 22:37

## 2021-03-13 NOTE — NUR
Pt admited to ICU bed 7, bedside report given to Augie. Pt received bolus of 
150mg amiodarone over 10min. HR improved to 110-130bpm. Now on continuous 
amiodarone drip of 1mg/min. 97% on 12L NR mask. Pt transported with RN and ED 
tech, with cardiac monitor and Amiodarone drip running at 1mg/min. Transported 
with all belongings.

## 2021-03-13 NOTE — NUR
Multiple MD's including Dr. Trinidad and Dr. Gallardo asked for orders regarding pt 
in unstable afib ranging from 140-170bpm. NNO received yet. Will continue to 
f/u with MD's.

## 2021-03-13 NOTE — NUR
-------------------------------------------------------------------------------

            *** Note undone in EDM - 03/13/21 at 1930 by [facility] ***             

-------------------------------------------------------------------------------

BG @1906 122mg/dL



NOTICED PT NON FOLLOWING AO0, UNABLE TO FOLLOW INSTRUCTIONS OR VERBALIZED. 
REASSESS BG @1920 AND READ 32 mg/dL. ERMD MADE AWARE, ADMINSTERED D50 IV. 
PATIENT AO4, VERBALIZING, ABLE TO FOLLOW INSTRUCTIONS

## 2021-03-13 NOTE — NUR
54 Y/O FEMALE COMPLAINS OF SOB X 4 DAYS. PT SPO2 88% RA, WHEEZING THROUGHOUT 
LUNGS, TACHYPNEA, PALE SKIN COLOR. PT ALSO COMPLAINS OF ANTERIOR INTERCOASTAL 
PAIN X MONTHS. PAIN 8/10, PULSATING, LOCAL. PT PUT ON 6L O2 VIA NASAL CANNULA. 
AO4, BREATHING LABORED, SKIN WAMR AND DRY. BED IN LOWEST POSITION, LOCKED, X1 
SIDERAIL UP. PT ON MONITOR. 



MED HX: COVID IN DECEMBER, PE IN DECEMBER 2020



NKA

## 2021-03-13 NOTE — NUR
PATIENT CARE AND REPORT RECEIVED FROM ER BJ RAMIREZ. PATIENT ALERT AND ORIENTED X 4, TO 
PERSON, PLACE, TIME AND EVENT, GCS 15. PATIENT ON HIGH FLOW OXYGEN AT 15 LPM. PATIENT STATED 
FEELING SLIGHTLY SHORT OF BREATH WHENEVER WALKING. PATIENT CONNECTED TO CONTINUOUS CARDIAC 
MONITORING,  AND RR 36, SAT UP INTO A HIGH FOWLERS POSITION TO ASSIST PATIENT WITH 
BREATHING. ON AUSCULTATION OF BREATH SOUNDS, BILATERAL LUNG BASES SLIGHTLY DIMINISHED WITH 
INSPIRATORY AND EXPIRATORY WHEEZES PRESENT. HEART SOUNDS IRREGULAR AND S1/S2 HEARD. 
ABDOMINAL SOUNDS NORMOACTIVE IN ALL 4 QUADRANTS AND FLAT IN APPEARANCE. SKINS INTACT. 
PATIENT IV ACCESS SITES INCLUDE LEFT HAND 20 G AND RIGHT AC 20 G. DRIPS RUNNING CURRENTLY 
INCLUDE AMIODARONE AT 1 MG/MIN AND NS AT 30 ML/HR. PATIENT WEIGHT IS 61 KG. PATIENT IS ABLE 
TO USE BEDSIDE COMMODE WITH ASSISTANCE. BED IS LOCKED AND LOWERED INTO A POSITION OF SAFETY 
AND PATIENT PLACED IN A POSITION OF COMFORT. WILL CONTINUE TO CLOSELY MONITOR AND FREQUENTLY 
ROUND THROUGHOUT THE SHIFT.

## 2021-03-13 NOTE — NUR
PT TACHYCARDIC HR-165 AND HYPERTENSIVE- 151/120. DR. BROWN PAGEJAMES AND S/W MAGDALENA FLORES TO DISCUSS PLAN OF CARE. PER DR. BROWN WANTS CARDIOLOGY PAGED FOR 
RECOMMENDATIONS.

## 2021-03-14 VITALS — SYSTOLIC BLOOD PRESSURE: 88 MMHG | DIASTOLIC BLOOD PRESSURE: 56 MMHG

## 2021-03-14 VITALS — DIASTOLIC BLOOD PRESSURE: 78 MMHG | SYSTOLIC BLOOD PRESSURE: 106 MMHG

## 2021-03-14 VITALS — DIASTOLIC BLOOD PRESSURE: 59 MMHG | SYSTOLIC BLOOD PRESSURE: 116 MMHG

## 2021-03-14 VITALS — SYSTOLIC BLOOD PRESSURE: 71 MMHG | DIASTOLIC BLOOD PRESSURE: 33 MMHG

## 2021-03-14 VITALS — DIASTOLIC BLOOD PRESSURE: 41 MMHG | SYSTOLIC BLOOD PRESSURE: 66 MMHG

## 2021-03-14 VITALS — DIASTOLIC BLOOD PRESSURE: 62 MMHG | SYSTOLIC BLOOD PRESSURE: 88 MMHG

## 2021-03-14 VITALS — SYSTOLIC BLOOD PRESSURE: 92 MMHG | DIASTOLIC BLOOD PRESSURE: 66 MMHG

## 2021-03-14 VITALS — SYSTOLIC BLOOD PRESSURE: 95 MMHG | DIASTOLIC BLOOD PRESSURE: 69 MMHG

## 2021-03-14 VITALS — SYSTOLIC BLOOD PRESSURE: 96 MMHG | DIASTOLIC BLOOD PRESSURE: 63 MMHG

## 2021-03-14 VITALS — DIASTOLIC BLOOD PRESSURE: 54 MMHG | SYSTOLIC BLOOD PRESSURE: 86 MMHG

## 2021-03-14 VITALS — DIASTOLIC BLOOD PRESSURE: 96 MMHG | SYSTOLIC BLOOD PRESSURE: 140 MMHG

## 2021-03-14 VITALS — DIASTOLIC BLOOD PRESSURE: 63 MMHG | SYSTOLIC BLOOD PRESSURE: 90 MMHG

## 2021-03-14 VITALS — SYSTOLIC BLOOD PRESSURE: 87 MMHG | DIASTOLIC BLOOD PRESSURE: 54 MMHG

## 2021-03-14 VITALS — DIASTOLIC BLOOD PRESSURE: 62 MMHG | SYSTOLIC BLOOD PRESSURE: 100 MMHG

## 2021-03-14 VITALS — SYSTOLIC BLOOD PRESSURE: 108 MMHG | DIASTOLIC BLOOD PRESSURE: 78 MMHG

## 2021-03-14 VITALS — DIASTOLIC BLOOD PRESSURE: 52 MMHG | SYSTOLIC BLOOD PRESSURE: 86 MMHG

## 2021-03-14 VITALS — DIASTOLIC BLOOD PRESSURE: 59 MMHG | SYSTOLIC BLOOD PRESSURE: 81 MMHG

## 2021-03-14 VITALS — SYSTOLIC BLOOD PRESSURE: 129 MMHG | DIASTOLIC BLOOD PRESSURE: 88 MMHG

## 2021-03-14 VITALS — DIASTOLIC BLOOD PRESSURE: 95 MMHG | SYSTOLIC BLOOD PRESSURE: 136 MMHG

## 2021-03-14 VITALS — SYSTOLIC BLOOD PRESSURE: 85 MMHG | DIASTOLIC BLOOD PRESSURE: 44 MMHG

## 2021-03-14 VITALS — DIASTOLIC BLOOD PRESSURE: 66 MMHG | SYSTOLIC BLOOD PRESSURE: 92 MMHG

## 2021-03-14 VITALS — SYSTOLIC BLOOD PRESSURE: 86 MMHG | DIASTOLIC BLOOD PRESSURE: 53 MMHG

## 2021-03-14 VITALS — DIASTOLIC BLOOD PRESSURE: 73 MMHG | SYSTOLIC BLOOD PRESSURE: 105 MMHG

## 2021-03-14 VITALS — SYSTOLIC BLOOD PRESSURE: 76 MMHG | DIASTOLIC BLOOD PRESSURE: 53 MMHG

## 2021-03-14 VITALS — SYSTOLIC BLOOD PRESSURE: 81 MMHG | DIASTOLIC BLOOD PRESSURE: 56 MMHG

## 2021-03-14 VITALS — DIASTOLIC BLOOD PRESSURE: 34 MMHG | SYSTOLIC BLOOD PRESSURE: 64 MMHG

## 2021-03-14 VITALS — DIASTOLIC BLOOD PRESSURE: 51 MMHG | SYSTOLIC BLOOD PRESSURE: 84 MMHG

## 2021-03-14 VITALS — DIASTOLIC BLOOD PRESSURE: 52 MMHG | SYSTOLIC BLOOD PRESSURE: 74 MMHG

## 2021-03-14 VITALS — SYSTOLIC BLOOD PRESSURE: 51 MMHG | DIASTOLIC BLOOD PRESSURE: 23 MMHG

## 2021-03-14 VITALS — SYSTOLIC BLOOD PRESSURE: 99 MMHG | DIASTOLIC BLOOD PRESSURE: 68 MMHG

## 2021-03-14 VITALS — DIASTOLIC BLOOD PRESSURE: 80 MMHG | SYSTOLIC BLOOD PRESSURE: 111 MMHG

## 2021-03-14 VITALS — DIASTOLIC BLOOD PRESSURE: 68 MMHG | SYSTOLIC BLOOD PRESSURE: 89 MMHG

## 2021-03-14 VITALS — SYSTOLIC BLOOD PRESSURE: 96 MMHG | DIASTOLIC BLOOD PRESSURE: 56 MMHG

## 2021-03-14 VITALS — DIASTOLIC BLOOD PRESSURE: 52 MMHG | SYSTOLIC BLOOD PRESSURE: 81 MMHG

## 2021-03-14 VITALS — DIASTOLIC BLOOD PRESSURE: 56 MMHG | SYSTOLIC BLOOD PRESSURE: 81 MMHG

## 2021-03-14 VITALS — SYSTOLIC BLOOD PRESSURE: 100 MMHG | DIASTOLIC BLOOD PRESSURE: 64 MMHG

## 2021-03-14 VITALS — DIASTOLIC BLOOD PRESSURE: 68 MMHG | SYSTOLIC BLOOD PRESSURE: 93 MMHG

## 2021-03-14 VITALS — SYSTOLIC BLOOD PRESSURE: 82 MMHG | DIASTOLIC BLOOD PRESSURE: 55 MMHG

## 2021-03-14 VITALS — SYSTOLIC BLOOD PRESSURE: 89 MMHG | DIASTOLIC BLOOD PRESSURE: 56 MMHG

## 2021-03-14 VITALS — DIASTOLIC BLOOD PRESSURE: 48 MMHG | SYSTOLIC BLOOD PRESSURE: 68 MMHG

## 2021-03-14 VITALS — DIASTOLIC BLOOD PRESSURE: 52 MMHG | SYSTOLIC BLOOD PRESSURE: 66 MMHG

## 2021-03-14 VITALS — SYSTOLIC BLOOD PRESSURE: 116 MMHG | DIASTOLIC BLOOD PRESSURE: 59 MMHG

## 2021-03-14 VITALS — DIASTOLIC BLOOD PRESSURE: 77 MMHG | SYSTOLIC BLOOD PRESSURE: 108 MMHG

## 2021-03-14 VITALS — SYSTOLIC BLOOD PRESSURE: 98 MMHG | DIASTOLIC BLOOD PRESSURE: 58 MMHG

## 2021-03-14 VITALS — DIASTOLIC BLOOD PRESSURE: 62 MMHG | SYSTOLIC BLOOD PRESSURE: 103 MMHG

## 2021-03-14 VITALS — DIASTOLIC BLOOD PRESSURE: 73 MMHG | SYSTOLIC BLOOD PRESSURE: 136 MMHG

## 2021-03-14 VITALS — SYSTOLIC BLOOD PRESSURE: 83 MMHG | DIASTOLIC BLOOD PRESSURE: 47 MMHG

## 2021-03-14 LAB
ANION GAP SERPL CALCULATED.3IONS-SCNC: 13.8 MMOL/L (ref 8–16)
ANION GAP SERPL CALCULATED.3IONS-SCNC: 20 MMOL/L (ref 8–16)
BASOPHILS # BLD AUTO: 0 K/UL (ref 0–0.22)
BASOPHILS NFR BLD AUTO: 0.3 % (ref 0–2)
BUN SERPL-MCNC: 50 MG/DL (ref 7–18)
BUN SERPL-MCNC: 61 MG/DL (ref 7–18)
CHLORIDE SERPL-SCNC: 101 MMOL/L (ref 98–107)
CHLORIDE SERPL-SCNC: 97 MMOL/L (ref 98–107)
CO2 SERPL-SCNC: 20.3 MMOL/L (ref 21–32)
CO2 SERPL-SCNC: 25.9 MMOL/L (ref 21–32)
CREAT SERPL-MCNC: 1.9 MG/DL (ref 0.6–1.3)
CREAT SERPL-MCNC: 2.3 MG/DL (ref 0.6–1.3)
EOSINOPHIL # BLD AUTO: 0 K/UL (ref 0–0.4)
EOSINOPHIL NFR BLD AUTO: 0 % (ref 0–4)
ERYTHROCYTE [DISTWIDTH] IN BLOOD BY AUTOMATED COUNT: 18.5 % (ref 11.6–13.7)
GFR SERPL CREATININE-BSD FRML MDRD: 28 ML/MIN (ref 90–?)
GFR SERPL CREATININE-BSD FRML MDRD: 35 ML/MIN (ref 90–?)
GLUCOSE SERPL-MCNC: 195 MG/DL (ref 74–106)
GLUCOSE SERPL-MCNC: 98 MG/DL (ref 74–106)
HCT VFR BLD AUTO: 39.1 % (ref 36–48)
HGB BLD-MCNC: 11.8 G/DL (ref 12–16)
HYALINE CASTS URNS QL MICRO: (no result) /LPF
LYMPHOCYTES # BLD AUTO: 0.5 K/UL (ref 2.5–16.5)
LYMPHOCYTES NFR BLD AUTO: 3.1 % (ref 20.5–51.1)
MCH RBC QN AUTO: 25 PG (ref 27–31)
MCHC RBC AUTO-ENTMCNC: 30 G/DL (ref 33–37)
MCV RBC AUTO: 82.5 FL (ref 80–94)
MONOCYTES # BLD AUTO: 0.8 K/UL (ref 0.8–1)
MONOCYTES NFR BLD AUTO: 5.2 % (ref 1.7–9.3)
NEUTROPHILS # BLD AUTO: 14.4 K/UL (ref 1.8–7.7)
NEUTROPHILS NFR BLD AUTO: 91.4 % (ref 42.2–75.2)
PLATELET # BLD AUTO: 330 K/UL (ref 140–450)
POTASSIUM SERPL-SCNC: 4.7 MMOL/L (ref 3.5–5.1)
POTASSIUM SERPL-SCNC: 6.3 MMOL/L (ref 3.5–5.1)
RBC # BLD AUTO: 4.73 MIL/UL (ref 4.2–5.4)
SODIUM SERPL-SCNC: 132 MMOL/L (ref 136–145)
SODIUM SERPL-SCNC: 135 MMOL/L (ref 136–145)
WBC # BLD AUTO: 15.8 K/UL (ref 4.8–10.8)

## 2021-03-14 PROCEDURE — 0BH17EZ INSERTION OF ENDOTRACHEAL AIRWAY INTO TRACHEA, VIA NATURAL OR ARTIFICIAL OPENING: ICD-10-PCS | Performed by: FAMILY MEDICINE

## 2021-03-14 PROCEDURE — 5A1945Z RESPIRATORY VENTILATION, 24-96 CONSECUTIVE HOURS: ICD-10-PCS | Performed by: FAMILY MEDICINE

## 2021-03-14 RX ADMIN — ENOXAPARIN SODIUM SCH MG: 60 INJECTION SUBCUTANEOUS at 09:00

## 2021-03-14 RX ADMIN — SODIUM CHLORIDE PRN MLS/HR: 9 INJECTION, SOLUTION INTRAVENOUS at 03:45

## 2021-03-14 RX ADMIN — PROPOFOL PRN MLS/HR: 10 INJECTION, EMULSION INTRAVENOUS at 12:58

## 2021-03-14 RX ADMIN — ENOXAPARIN SODIUM SCH MG: 60 INJECTION SUBCUTANEOUS at 21:02

## 2021-03-14 RX ADMIN — DOBUTAMINE HYDROCHLORIDE SCH MLS/HR: 200 INJECTION INTRAVENOUS at 21:46

## 2021-03-14 RX ADMIN — DOBUTAMINE HYDROCHLORIDE SCH MLS/HR: 200 INJECTION INTRAVENOUS at 12:05

## 2021-03-14 RX ADMIN — NOREPINEPHRINE BITARTRATE PRN MLS/HR: 1 INJECTION INTRAVENOUS at 23:55

## 2021-03-14 RX ADMIN — NOREPINEPHRINE BITARTRATE PRN MLS/HR: 1 INJECTION INTRAVENOUS at 22:56

## 2021-03-14 RX ADMIN — NICARDIPINE HYDROCHLORIDE PRN MLS/HR: 25 INJECTION INTRAVENOUS at 13:34

## 2021-03-14 RX ADMIN — PANTOPRAZOLE SODIUM SCH MG: 40 TABLET, DELAYED RELEASE ORAL at 09:00

## 2021-03-14 RX ADMIN — PROPOFOL PRN MLS/HR: 10 INJECTION, EMULSION INTRAVENOUS at 03:50

## 2021-03-14 RX ADMIN — FUROSEMIDE SCH MG: 10 INJECTION, SOLUTION INTRAMUSCULAR; INTRAVENOUS at 09:00

## 2021-03-14 RX ADMIN — Medication SCH MG: at 09:00

## 2021-03-14 RX ADMIN — FUROSEMIDE SCH MG: 10 INJECTION, SOLUTION INTRAMUSCULAR; INTRAVENOUS at 17:02

## 2021-03-14 RX ADMIN — SODIUM CHLORIDE PRN MLS/HR: 9 INJECTION, SOLUTION INTRAVENOUS at 19:50

## 2021-03-14 NOTE — NUR
RT TITRATED FIO2 TO 60%, PATIENT TOLERATING ADJUSTED VENT SETTINGS, WILL CONTINUE TO CLOSELY 
MONITOR AND FREQUENTLY ROUND.

## 2021-03-14 NOTE — NUR
RECIEVED BEDSIDE REPORT FROM DAY SHIFT RN, PT IN CRITICAL CONDTITION, SEDATED RASS -3, LYING 
IN BED SUPINE W/ HOB 30 DEGREES AND BED LOW, SR ON MONITOR, HYPOTENSIVE, PERIPHERAL PULSES 
PALPABLE TO TOUCH, ETT TO VENT ACPRVC  FIO2 28% PEEP 6 RATE 20, ABD SOFT AND NON 
TENDER TO TOUCH, SKIN WARMA AND DRY TO TOUCH, AFEBRILE, BEAR HUGGER IN PLACE, SKIN INTACT, 
FC IN PLACE DRAINING VIA GRAVITY W/ LIGHT MADELINE URINE, LIJ TL INFUSING DOBUTAMINE, 
EPINEPHRINE, LEVOPHED, NEOSYNEPHRINE AND MORPHINE, PT SHOWING NO SIGNS OF ACUTE DISTRESS AT 
THE MOMENT, SAFETY MEASURES IN PLACE, WILL CONTINUE WITH CURRENT POC

## 2021-03-14 NOTE — NUR
DURING BEDSIDE HANDOVER PT BECAME BRADYCARDIC IN 30S AND DESATURATED TO 60S ON FIO2 50% WITH 
ET TUBE TO VENTILATOR. MAPS IN 40S WITH LEVOPHED AT 24 MCG/MIN. PT PULSELESS AT 0710 CODE 
BLUE CALLED AND COMPRESSIONS BEGAN. ROSC AT 0715. SEE MAR FOR MEDICATIONS ADMINISTERED. L IJ 
PLACED BY MD AFTER CODE FOR USE OF PRESSORS. CONSENT RECEIVED FROM FAMILY. CARDIOLOGIST 
REQUESTING EPINEPHRINE AND DOBUTAMINE FOR PRESSURE SUPPORT. WILL CONTINUE TO MONITOR AND 
ASSESS.

## 2021-03-14 NOTE — NUR
CRITICAL LAB RESULT: K+ 6.3 CALLED TO DR. BROWN. MD REQUESTED LOKELMA TO BE GIVEN ONE TIME PER 
G TUBE. UPON RECHECK, K+ RESULTED 4.3. WILL CONTINUE TO MONITOR AND ASSESS.

## 2021-03-14 NOTE — NUR
PT WAS INTUBATED W/ 7.5 ETT SECURED @ 24CM LIP LINE. PT PLACED ON VENT SETTINGS OF PRVC 350 
+8, f18 100%. ABG WAS UN-OBTAINABLE AT THIS TIME DUE TO LOW BP. WILL ATTEMPT AGAIN AT A 
LATER TIME. CXR WAS TAKEN PENDING INTERP FOR TUBE PLACEMENT. AMBU AT BEDSIDE VENT PLUGGED 
INTO RED OUTLET WILL CONTINUE TO MONITOR   

-------------------------------------------------------------------------------

Addendum: 03/14/21 at 0333 by Jassi Gunderson Jr RT

-------------------------------------------------------------------------------

PT HAD PEAK HIGH PEAK PRESSURES ON AC/VC THEN MODE WAS SWITCHED TO PRVC. 

-------------------------------------------------------------------------------

Addendum: 03/14/21 at 0336 by Jr CHLOÉ Galdamez

-------------------------------------------------------------------------------

CORRECTION

## 2021-03-14 NOTE — NUR
PATIENT INTUBATED AT 0147, PLACED ON VENT, VENT SETTINGS PRVC, , PEEP 8, RR 18, FIO2 
90%. WILL CONTINUE TO CLOSELY MONITOR AND FREQUENTLY ROUND.

## 2021-03-14 NOTE — NUR
PROGRESS NOTE UPDATE. PATIENT ETT TO VENT, VENT SETTINGS AC PRVC, , PEEP 8, RR  18, 
FIO2 60%. CURRENTLY TOLERATING THERAPIES. NPO EXCEPT MEDS. LEFT NARE NG TUBE IN PLACE, 
INTACT, PATENT AND SECURED. CONTINUOUS CARDIAC MONITORING HR 57 AND RESPIRATIONS 24 ON THE 
MONITOR. CURRENT IV ACCESS SITES INCLUDE RIGHT AC 20G PERIPHERAL IV AND RIGHT IJ 18G SALINE 
LOCK. IV DRIPS CURRENTLY RUNNING INCLUDE AMIODARONE AT 0.5 MG/MIN, NS AT 30 ML/HR, 10 
MCG/KG/MIN, MORPHINE AT 1 MG/HR, AND LEVOPHED 8 MCG/MIN. FAIR CATHETER INTACT, PATENT, 
DRAINING WELL AND SECURED. WILL CONTINUE TO CLOSELY MONITOR AND FREQUENTLY ROUND.

## 2021-03-14 NOTE — NUR
CALLED DR. MATHEWS REGARDING CONTINUING AMIODARONE ORDER, ORIGINAL ORDER WAS DC, DR. MATHEWS GAVE 
VERBAL ORDER TO CONTINUE AMIODARONE ORDER AND FOLLOW PROTOCOL. WILL CARRY OUT ORDERS.

## 2021-03-14 NOTE — NUR
FAIR CATHETER INSERTED PER ORDER, INTACT, PATENT, DRAINING WELL AND SECURED TO PATIENT. 
WILL CONTINUE TO CLOSELY MONITOR.

## 2021-03-14 NOTE — NUR
BP NEEDS INCREASING DRASTICALLY. NOTIFIED MD OF CHANGES IN BP. MAPS IN 40S. MD CALLED FAMILY 
TO DISCUSS CODE STATUS , BUT FAMILY WANTING TO CONTINUE FULL CODE STATUS AND ALL MEASURES. 
CURRENTLY MAXED ON EPINEPHRINE AND DOBUTAMINE. LEVOPHED RUNNING AT 24 MCG/MIN. NEOSYNEPHRINE 
RUNNING  MCG/KG/MIN. WILL CONTINUE TO TITRATE AVAILABLE PRESSORS AS NEEDED.

## 2021-03-14 NOTE — NUR
AT BEDSIDE WITH PATIENT, RAPID RESPONSE CALLED TO BEDSIDE AT 0137, ER MD, ICU CHARGE RN AND 
XRAY ARRIVED PROMPTLY.

## 2021-03-14 NOTE — NUR
AT BEDSIDE WITH PATIENT GIVING DAYSHIFT RN REPORT. PATIENT BEGAN TO SLOWLY JONATHAN DOWN INTO 
THE 40'S AND 30'S. PULSES CHECKED, NO PULSES FOUND, CALLED CODE BLUE @ 0710 AT BEDSIDE AND 
IMMEDIATELY INITIATED CPR INTERVENTIONS. ACHIEVED ROSC @ 0715. XRAY OBTAINED AT BEDSIDE. SEE 
CODE PAPER NOTES.

## 2021-03-14 NOTE — NUR
PER NURSING SUPERVISOR OKAY FOR FAMILY MEMBERS TO SEE PT DUE TO SEVERITY OF CARDIOGENIC 
SHOCK. NURSING SUPERVISOR ASSISTED ALL FAMILY MEMBERS TO SEE PT THROUGH WINDOW. WRITER 
EXPLAINED AND EDUCATED FAMILY ON CURRENT RESPIRATORY AND CARDIAC STATUS. FAMILY VERBALIZED 
UNDERSTANDING.

## 2021-03-14 NOTE — NUR
PATIENT STATED FEELING SLIGHTLY MORE DIFFICULTY IN BREATHING WITH CURRENT HIGH FLOW THERAPY. 
PER REASSESSMENT AUSCULTATION OF LUNG SOUNDS, BILATERAL SUPERIOR LUNG GARCIA CLEAR, BASES 
CONTINUE TO BE DIMINISHED WITH PRESENT INSPIRATORY AND EXPIRATORY WHEEZES. PATIENT 
PRESENTING WITH WORSENING SHORT OF BREATH, OXYGEN SATURATION 85%, HR 59 BPM ON THE MONITOR. 
CALLED RAPID RESPONSE TO BEDSIDE

## 2021-03-14 NOTE — NUR
PATIENT TOLERATING VENT, SEDATION, AND OTHER CONCURRENT MEDICATION THERAPIES WELL. WILL 
CONTINUE TO CLOSELY MONITOR AND FREQUENTLY ROUND.

## 2021-03-14 NOTE — NUR
REPOSITIONED PT, ORAL CARE AND SUCTIONING PERFORMED, PT AFEBRILE AND SHOWING NO SIGNS OF 
ACUTE DISTRESS

## 2021-03-14 NOTE — NUR
PATIENT CURRENTLY RESTING IN A POSITION OF COMFORT, TOLERATING CURRENT HIGH FLOW OXYGEN 
WELL, WILL CONTINUE TO CLOSELY MONITOR AND FREQUENTLY ROUND.

## 2021-03-15 VITALS — SYSTOLIC BLOOD PRESSURE: 72 MMHG | DIASTOLIC BLOOD PRESSURE: 58 MMHG

## 2021-03-15 VITALS — DIASTOLIC BLOOD PRESSURE: 34 MMHG | SYSTOLIC BLOOD PRESSURE: 62 MMHG

## 2021-03-15 VITALS — SYSTOLIC BLOOD PRESSURE: 85 MMHG | DIASTOLIC BLOOD PRESSURE: 44 MMHG

## 2021-03-15 VITALS — SYSTOLIC BLOOD PRESSURE: 86 MMHG | DIASTOLIC BLOOD PRESSURE: 55 MMHG

## 2021-03-15 VITALS — DIASTOLIC BLOOD PRESSURE: 41 MMHG | SYSTOLIC BLOOD PRESSURE: 62 MMHG

## 2021-03-15 VITALS — DIASTOLIC BLOOD PRESSURE: 58 MMHG | SYSTOLIC BLOOD PRESSURE: 131 MMHG

## 2021-03-15 VITALS — DIASTOLIC BLOOD PRESSURE: 47 MMHG | SYSTOLIC BLOOD PRESSURE: 79 MMHG

## 2021-03-15 VITALS — SYSTOLIC BLOOD PRESSURE: 65 MMHG | DIASTOLIC BLOOD PRESSURE: 44 MMHG

## 2021-03-15 VITALS — DIASTOLIC BLOOD PRESSURE: 38 MMHG | SYSTOLIC BLOOD PRESSURE: 64 MMHG

## 2021-03-15 VITALS — SYSTOLIC BLOOD PRESSURE: 64 MMHG | DIASTOLIC BLOOD PRESSURE: 38 MMHG

## 2021-03-15 VITALS — SYSTOLIC BLOOD PRESSURE: 69 MMHG | DIASTOLIC BLOOD PRESSURE: 75 MMHG

## 2021-03-15 VITALS — DIASTOLIC BLOOD PRESSURE: 49 MMHG | SYSTOLIC BLOOD PRESSURE: 91 MMHG

## 2021-03-15 VITALS — DIASTOLIC BLOOD PRESSURE: 46 MMHG | SYSTOLIC BLOOD PRESSURE: 74 MMHG

## 2021-03-15 VITALS — SYSTOLIC BLOOD PRESSURE: 62 MMHG | DIASTOLIC BLOOD PRESSURE: 36 MMHG

## 2021-03-15 VITALS — DIASTOLIC BLOOD PRESSURE: 33 MMHG | SYSTOLIC BLOOD PRESSURE: 57 MMHG

## 2021-03-15 VITALS — DIASTOLIC BLOOD PRESSURE: 47 MMHG | SYSTOLIC BLOOD PRESSURE: 91 MMHG

## 2021-03-15 VITALS — SYSTOLIC BLOOD PRESSURE: 57 MMHG | DIASTOLIC BLOOD PRESSURE: 39 MMHG

## 2021-03-15 VITALS — SYSTOLIC BLOOD PRESSURE: 73 MMHG | DIASTOLIC BLOOD PRESSURE: 47 MMHG

## 2021-03-15 VITALS — SYSTOLIC BLOOD PRESSURE: 91 MMHG | DIASTOLIC BLOOD PRESSURE: 49 MMHG

## 2021-03-15 VITALS — SYSTOLIC BLOOD PRESSURE: 58 MMHG | DIASTOLIC BLOOD PRESSURE: 37 MMHG

## 2021-03-15 LAB
ANION GAP SERPL CALCULATED.3IONS-SCNC: 13.7 MMOL/L (ref 8–16)
BASOPHILS # BLD AUTO: 0.1 K/UL (ref 0–0.22)
BASOPHILS NFR BLD AUTO: 0.8 % (ref 0–2)
BUN SERPL-MCNC: 72 MG/DL (ref 7–18)
CHLORIDE SERPL-SCNC: 94 MMOL/L (ref 98–107)
CO2 SERPL-SCNC: 24.8 MMOL/L (ref 21–32)
CREAT SERPL-MCNC: 2.8 MG/DL (ref 0.6–1.3)
EOSINOPHIL # BLD AUTO: 0.2 K/UL (ref 0–0.4)
EOSINOPHIL NFR BLD AUTO: 2.2 % (ref 0–4)
ERYTHROCYTE [DISTWIDTH] IN BLOOD BY AUTOMATED COUNT: 18.2 % (ref 11.6–13.7)
GFR SERPL CREATININE-BSD FRML MDRD: 23 ML/MIN (ref 90–?)
GLUCOSE SERPL-MCNC: 74 MG/DL (ref 74–106)
HCT VFR BLD AUTO: 31.3 % (ref 36–48)
HGB BLD-MCNC: 9.9 G/DL (ref 12–16)
LYMPHOCYTES # BLD AUTO: 0.8 K/UL (ref 2.5–16.5)
LYMPHOCYTES NFR BLD AUTO: 11.5 % (ref 20.5–51.1)
MCH RBC QN AUTO: 25 PG (ref 27–31)
MCHC RBC AUTO-ENTMCNC: 32 G/DL (ref 33–37)
MCV RBC AUTO: 79.5 FL (ref 80–94)
MONOCYTES # BLD AUTO: 0.3 K/UL (ref 0.8–1)
MONOCYTES NFR BLD AUTO: 4.2 % (ref 1.7–9.3)
NEUTROPHILS # BLD AUTO: 5.8 K/UL (ref 1.8–7.7)
NEUTROPHILS NFR BLD AUTO: 81.3 % (ref 42.2–75.2)
PLATELET # BLD AUTO: 239 K/UL (ref 140–450)
POTASSIUM SERPL-SCNC: 4.5 MMOL/L (ref 3.5–5.1)
RBC # BLD AUTO: 3.94 MIL/UL (ref 4.2–5.4)
SODIUM SERPL-SCNC: 128 MMOL/L (ref 136–145)
T3RU NFR SERPL: 32 % (ref 24–39)
T4 SERPL-MCNC: 6.4 UG/DL (ref 4.5–12)
WBC # BLD AUTO: 7.1 K/UL (ref 4.8–10.8)

## 2021-03-15 RX ADMIN — ENOXAPARIN SODIUM SCH MG: 60 INJECTION SUBCUTANEOUS at 08:12

## 2021-03-15 RX ADMIN — NOREPINEPHRINE BITARTRATE PRN MLS/HR: 1 INJECTION INTRAVENOUS at 10:02

## 2021-03-15 RX ADMIN — NOREPINEPHRINE BITARTRATE PRN MLS/HR: 1 INJECTION INTRAVENOUS at 03:09

## 2021-03-15 RX ADMIN — NICARDIPINE HYDROCHLORIDE PRN MLS/HR: 25 INJECTION INTRAVENOUS at 00:42

## 2021-03-15 RX ADMIN — PANTOPRAZOLE SODIUM SCH MG: 40 TABLET, DELAYED RELEASE ORAL at 08:11

## 2021-03-15 RX ADMIN — NOREPINEPHRINE BITARTRATE PRN MLS/HR: 1 INJECTION INTRAVENOUS at 12:30

## 2021-03-15 RX ADMIN — NOREPINEPHRINE BITARTRATE PRN MLS/HR: 1 INJECTION INTRAVENOUS at 05:18

## 2021-03-15 RX ADMIN — DOBUTAMINE HYDROCHLORIDE SCH MLS/HR: 200 INJECTION INTRAVENOUS at 08:54

## 2021-03-15 RX ADMIN — NICARDIPINE HYDROCHLORIDE PRN MLS/HR: 25 INJECTION INTRAVENOUS at 09:55

## 2021-03-15 RX ADMIN — DOBUTAMINE HYDROCHLORIDE SCH MLS/HR: 200 INJECTION INTRAVENOUS at 12:34

## 2021-03-15 RX ADMIN — DOBUTAMINE HYDROCHLORIDE SCH MLS/HR: 200 INJECTION INTRAVENOUS at 05:22

## 2021-03-15 RX ADMIN — FUROSEMIDE SCH MG: 10 INJECTION, SOLUTION INTRAMUSCULAR; INTRAVENOUS at 08:11

## 2021-03-15 RX ADMIN — Medication SCH MG: at 08:11

## 2021-03-15 NOTE — NUR
BEDSIDE REPORT RECEIVED FROM NIGHT SHIFT RN. PT LYING IN BED, INTUBATED, NO RESPONSE TO 
NOXIOUS STIMULI. ETT TO VENT, 28%FI02, 365 TIDAL, PEEP 6, AND 20RR. CURRENTLY NSR ON THE 
TELE MONITOR. NG TUBE IN LEFT NARE, NPO EXCEPT FOR MEDS. LEFT IJ TRIPLE LUMEN RUNNING 
DOBUTAMINE @40MCG, EPI @10MCG, LEVO @30MCG, CEDRIC-SYNEPHRINE@150MCG AND NS @30CC/HR. FAIR 
CATHETER IN PLACE. BED IN LOW POSITION, ALL SAFETY PRECAUTIONS IN PLACE, WILL CONTINUE TO 
MONITOR.

## 2021-03-15 NOTE — NUR
CALLED ABISAI CHRISTIANSON, PATIENT'S SON AND UPDATED ON PATIENT'S MEDICAL CONDITION; HE SAID HE 
STILL WANTS A FULL RESUSCITATION IN CASE HER MOTHER STOPS BREATHING AND NO PULSE.

## 2021-03-15 NOTE — NUR
PBX CALLED SAYING SON ABISAI IS IN FRONT LOBBY TO SEEING PT. NOTIFIED DR. MAN THAT HE HAS 
NOT YET BEEN INFORMED THAT PT JUST PASSED. DR. MAN PLANS TO GO OUT AND SEE ABISAI AND LET 
HIM KNOW ABOUT PT'S PASSING.

## 2021-03-15 NOTE — NUR
SPOKE TO OUMAR ABARCA FROM [employer]. REFERRAL NUMBER [id number]. WILL BE RETURNING A CALL 
IF PT IS A CANDIDATE.

## 2021-03-15 NOTE — NUR
DAUGHTER AT BEDSIDE. ANSWERED QUESTIONS TO THE BEST OF MY ABILITY. STATED THAT HER 
GRANDMOTHER PLANS TO CALL HOUSE SUPERVISOR TO INFORM MORTUARY OF CHOICE. HOUSE SUPERVISOR 
NOW HANDLING.
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
## 2021-03-15 NOTE — NUR
CODE WAS CALLED APPROX 02:25 ON PT AND ROSC WAS OBTAINED AFTER 1 ROUND OF COMPRESSIONS. ABG 
IS BEING DRAWN AND FIO2 TITRATED TO 70% ON VENT WILL CONTINUE TO MONITOR

## 2021-03-15 NOTE — NUR
PT  AT 1315, PRONOUNCED BY ER MD DR. EASON. RESIDENT JASON MENCHACA TO INFORM DR. MAN SO THAT FAMILY CAN BE NOTIFIED.

## 2021-03-15 NOTE — NUR
PER HOUSE SUPERVISOR SONDRA, HE AND DR. MAN SPOKE TO SON ABISAI. OKAY FOR ABISAI TO COME 
BACK AND SEE PT.

## 2021-03-15 NOTE — NUR
SPOKE WITH DEPUTY MEET MORAN. NOT A 'S CASE, AND OKAY TO RELEASE BODY.  'S 
OFFICE NUMBER IS [id number]

## 2021-03-15 NOTE — NUR
PT HYPOTENSIVE, HR DECREASED TO 50'S, PT STARTED TO TURN BLUE, CHECKLED FOR PULSE AND NO 
PULSE FELT, CODE BLUE INITIATED

## 2021-03-15 NOTE — NUR
RECEIVED CALL BACK FROM [facility], PROVIDED THEM WITH NEEDED INFORMATION. NO FURTHER 
UPDATES AT THIS TIME.

## 2021-03-15 NOTE — NUR
PATIENT HAS BEEN SCREENED AND CATEGORIZED AS HIGH NUTRITION RISK. PATIENT WILL BE SEEN 
WITHIN 1-2 DAYS OF ADMISSION.



03/15/21



GREG CATALAN RD

## 2021-03-15 NOTE — NUR
DC PLANN[age] YRS OLD FEMALE PATIENT WAS ADMITTED FROM HOME WITH A DX OF HYPOXIA. PT HAS A HX OF 
ASTHMA, COVID-19 PNA ,HTN, METH ABUSE PE, HE WITH EF 10-15% , CARDIOMYOPATHY, COPD AND DM. 
CXR SHOWED CARDIOMEGALY . PT INTUBATED SEDATED.ON DOPAMINE, LEVO & EPI DRIP.  PCR COVID 
PENDING. CONSULTED WITH CARDIO, PULMO AND NEPHRO. DC PLAN PER PT RESPONSE WITH THE 
TREATMENT. CM TO FOLLOW
